# Patient Record
Sex: MALE | Race: OTHER | HISPANIC OR LATINO | Employment: OTHER | URBAN - METROPOLITAN AREA
[De-identification: names, ages, dates, MRNs, and addresses within clinical notes are randomized per-mention and may not be internally consistent; named-entity substitution may affect disease eponyms.]

---

## 2018-11-12 ENCOUNTER — OFFICE VISIT (OUTPATIENT)
Dept: NEUROLOGY | Facility: MEDICAL CENTER | Age: 48
End: 2018-11-12
Payer: MEDICARE

## 2018-11-12 VITALS
TEMPERATURE: 97.8 F | HEART RATE: 75 BPM | WEIGHT: 138.8 LBS | OXYGEN SATURATION: 96 % | SYSTOLIC BLOOD PRESSURE: 100 MMHG | HEIGHT: 63 IN | BODY MASS INDEX: 24.59 KG/M2 | DIASTOLIC BLOOD PRESSURE: 60 MMHG | RESPIRATION RATE: 16 BRPM

## 2018-11-12 DIAGNOSIS — R25.1 TREMOR: ICD-10-CM

## 2018-11-12 DIAGNOSIS — R56.9 SEIZURE (HCC): Primary | ICD-10-CM

## 2018-11-12 PROCEDURE — 99205 OFFICE O/P NEW HI 60 MIN: CPT | Performed by: PSYCHIATRY & NEUROLOGY

## 2018-11-12 RX ORDER — PHENYTOIN SODIUM 100 MG/1
200 CAPSULE, EXTENDED RELEASE ORAL 2 TIMES DAILY
Qty: 120 CAP | Refills: 6 | Status: SHIPPED | OUTPATIENT
Start: 2018-11-12 | End: 2019-05-09

## 2018-11-12 RX ORDER — PROPRANOLOL HYDROCHLORIDE 10 MG/1
10 TABLET ORAL 3 TIMES DAILY
COMMUNITY
End: 2018-11-12

## 2018-11-12 RX ORDER — PHENYTOIN SODIUM 100 MG/1
200 CAPSULE, EXTENDED RELEASE ORAL 2 TIMES DAILY
Qty: 120 CAP | Refills: 6 | COMMUNITY
Start: 2018-11-12 | End: 2018-11-12 | Stop reason: SDUPTHER

## 2018-11-12 RX ORDER — PROPRANOLOL HYDROCHLORIDE 10 MG/1
10 TABLET ORAL 2 TIMES DAILY
Qty: 60 TAB | Refills: 6 | Status: SHIPPED | OUTPATIENT
Start: 2018-11-12 | End: 2020-08-27

## 2018-11-12 RX ORDER — PHENYTOIN SODIUM 100 MG/1
100 CAPSULE, EXTENDED RELEASE ORAL 3 TIMES DAILY
COMMUNITY
End: 2018-11-12

## 2018-11-12 RX ORDER — PROPRANOLOL HYDROCHLORIDE 10 MG/1
10 TABLET ORAL 2 TIMES DAILY
Qty: 60 TAB | Refills: 6 | Status: SHIPPED | OUTPATIENT
Start: 2018-11-12 | End: 2018-11-12 | Stop reason: SDUPTHER

## 2018-11-12 ASSESSMENT — ENCOUNTER SYMPTOMS
SEIZURES: 1
FALLS: 0
NAUSEA: 0
BRUISES/BLEEDS EASILY: 0
ABDOMINAL PAIN: 0
MYALGIAS: 1
LOSS OF CONSCIOUSNESS: 1
CONSTIPATION: 0
WEIGHT LOSS: 0
TREMORS: 1

## 2018-11-12 ASSESSMENT — PATIENT HEALTH QUESTIONNAIRE - PHQ9: CLINICAL INTERPRETATION OF PHQ2 SCORE: 0

## 2018-11-12 NOTE — PROGRESS NOTES
Subjective:      Karri Lancaster is a 48 y.o. male who presents with family, from the office of Dr. 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     KAHLIL Zeng is a pleasant Urdu-speaking, [facility] who has been suffering from seizures since about 3 months of age.  His recollections of his childhood and the events surrounding his seizures is limited, family's is as well.  They state that his birth itself was unremarkable but that he suffered from trauma when he fell at about 3 months of age out of a hammock.  From that moment forward he has had seizures.    He remembers that he would feel dizzy and suffer from from mild headache prior to suffering from a generalized seizure.  They describe generalized tonic-clonic movements, he has bitten the inside of his mouth, has never been incontinent.  There was a postictal phase of sustained malaise and confusion, but he usually sleeps it off within a matter of hours.  There have never been sequelae.    Over the last year, the focal onset symptoms have dissipated, he simply suffers from generalized activity.  They identify heat and a lot of stress as a consistent triggers.    The seizures occur typically during the daytime, he has never had one while he has been asleep.  He is averaging now about 4-6 events in a year, they have never clustered together.    He has been on Dilantin 100 mg, 3 times daily, for as long as he can remember.  He had been somewhat inconsistent with the timing of his doses in the past, resulting in seizure breakthroughs, so he is fairly consistent with an every 8-hour regimen.  He is compliant.    His workup was limited, they know very little as to what was done, though they recall an EEG at some point in childhood.  All of this was done when he was still living in [facility].  Since moving to [place], he has had no subsequent workup, medications have been provided by his  "PCP.    He does have a history of tremor that has developed in the hands over the last year or more.  It seems to be worsened when he uses the extremities or when he is anxious.  It is in the hands only.  There does not seem to be an involvement of the head or voice.  Placed on Inderal 10 mg, twice daily, he states that this is helped slightly.    They deny any major problems with motor or visual function since birth, he has had some cognitive delay though he still holds a full-time job.  He is always had balance problems as well.  Medical history is otherwise unremarkable.  There is no surgical history of note from my standpoint.    As above, they know very little of his birth family.  He does not smoke or drink.  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  He does not drive.    He is on Dilantin 100 mg, 3 times daily and Inderal 10 mg, twice daily.    Review of Systems   Constitutional: Positive for malaise/fatigue. Negative for weight loss.   Gastrointestinal: Negative for abdominal pain, constipation and nausea.   Musculoskeletal: Positive for joint pain and myalgias. Negative for falls.   Neurological: Positive for tremors, seizures and loss of consciousness.   Endo/Heme/Allergies: Does not bruise/bleed easily.   All other systems reviewed and are negative.       Objective:     /60 (BP Location: Left arm, Patient Position: Sitting)   Pulse 75   Temp 36.6 °C (97.8 °F) (Temporal)   Resp 16   Ht 1.6 m (5' 3\")   Wt 63 kg (138 lb 12.8 oz)   SpO2 96%   BMI 24.59 kg/m²      Physical Exam    He appears in no acute distress.  His vital signs are stable.  There is no malar rash or temporal tenderness.  There is some mild gingival hyperplasia.  The neck is supple, range of motion is full.  Cardiac evaluation reveals a regular rhythm.    With translation services provided, though his mental processing speed is a little slowed, he is fully oriented, there is no aphasia, agnosia, apraxia, or " inattention.  His affect is a little blunted and flattened, his mood is euthymic.    PERRLA/EOMI, there is facial bradykinesia and hypophonia, facial movements appear symmetric though at rest there is a mild droop of the right nasolabial fold.  Visual fields are grossly full to movement detection.  I could not visualize the fundus accurately on either side.  Sensory exam is intact to light touch and temperature bilaterally, there is no dysarthria, the tongue protrudes slowly but at midline, uvula is midline, shoulder shrug is slightly slowed with the right.    Bradykinesia is seen throughout, a combination of spasticity with rigidity is seen, present right more than left.  There is no drift, a fine low amplitude and high-frequency tremulousness can be seen with both hands, left greater than right, more prominent with use of the extremity and when maintained against gravity.  There is no clear tremor at rest.  Strength is grossly intact.  Reflexes are very brisk throughout, more on the right side, the right toe is upgoing, the left equivocal on plantar stimulation.    He walks with notable hypokinesia and bradykinesia, arm swing is diminished bilaterally, stride length is diminished, there is notable postural instability.  He turns stiffly, en bloc.  There is no appendicular dystaxia.  Repetitive movements are notably diminished in amplitude bilaterally, slower with the right hand and foot when compared to the left.    Sensory exam is grossly intact vibration and temperature, Romberg is absent.     Assessment/Plan:     1. Seizure (HCC)  Likely asymptomatic issue, it sounds as if there had been focal onset with rapid secondary generalization, now the focality is not as prominent.  Regardless, it sounds as if he will need medication lifelong, baseline workup does need to be repeated, including MRI imaging and EEG studies.  His seizure control is clearly suboptimal, we can adjust medications slightly, but given the  language difficulties, major changes will wait pending a new consultation with Dr. Uriarte, our epilepsy specialist and who is also bilingual.    For now, baseline blood work, Dilantin level and vitamin D levels are all critical.  Dilantin will be increased to 200 mg, every 12 hours.  They understand the need for compliance.  We also talked about circumstances under which they need to bring him into the emergency room, otherwise they know to keep him safe and allow him to sleep things off.  We also talked briefly about the nature of seizures and epilepsy, treatment options, etc.  I would like to get him onto a second generation antiepileptic given all the chronic adverse problems that can occur with Dilantin and other first generation medication such as osteopenia, gingival hyperplasia, chronic hepatitis, marrow suppression, neuropathy, etc., etc....    I discussed the case at length with Dr. Crystal who is willing to assume care.  He agrees with the preliminary recommendations and workup, subsequent medication changes can be made pending his evaluations and recommendations.    Physically, this gentleman also presents with some very interesting symptoms that suggest parkinsonism, though the tremor he has looks more of an action based process.  There is also notable, bilateral spasticity which could be related to chronic CNS insult, though it could also be part of a parkinsonian-like state or other EPS disorder.  Whether or not this is all congenital, related to trauma, exposure, familial history, etc. remains to be seen.  Dilantin itself is not typically associated with EPS symptomatology, though it is known to cause tremor.    - phenytoin ER (DILANTIN) 100 MG Cap; Take 2 Caps by mouth 2 times a day.  Dispense: 120 Cap; Refill: 6  - MR-BRAIN-W/O; Future  - COMP METABOLIC PANEL; Future  - CBC WITH DIFFERENTIAL; Future  - DILANTIN; Future  - VITAMIN 1,25 DIHYDROXY; Future  - REFERRAL TO NEURODIAGNOSTICS (EEG,EP,EMG/NCS/DBS)  Modality Requested: EEG    2. Tremor  It appears an action-based process, patient seems to feel that Inderal 10 mg, twice daily, is helping, so it can be continued safely.  I think simple follow-up and observation is all that will be required, I suspect that Dr. Quezada probably has checked a thyroid function panel, though additional autoimmune markers, might be necessary moving forwards.    - propranolol (INDERAL) 10 MG Tab; Take 1 Tab by mouth 2 times a day.  Dispense: 60 Tab; Refill: 6    Time: 60 minutes spent face-to-face for exam, review, discussion, and education, of this over 50% of the time spent counseling and coordinating care.

## 2018-11-27 ENCOUNTER — HOSPITAL ENCOUNTER (OUTPATIENT)
Dept: RADIOLOGY | Facility: MEDICAL CENTER | Age: 48
End: 2018-11-27
Attending: PSYCHIATRY & NEUROLOGY
Payer: MEDICARE

## 2018-11-27 DIAGNOSIS — R56.9 SEIZURE (HCC): ICD-10-CM

## 2018-11-27 PROCEDURE — 70551 MRI BRAIN STEM W/O DYE: CPT

## 2018-11-28 ENCOUNTER — TELEPHONE (OUTPATIENT)
Dept: NEUROLOGY | Facility: MEDICAL CENTER | Age: 48
End: 2018-11-28

## 2018-11-28 NOTE — TELEPHONE ENCOUNTER
Patient's mother called stated pt has a appointment to see dr zamora may 2019, looks like pt recently say dr hernandez on 11/12/18 for seizure. Mom stated pt had a seizure on Monday that last 3 mins and a seizure today that lasted 2 min, mom is requesting to see provider asap. I informed dr zamora and since pt is not under is care yet he asked me to route this message to dr hernandez. Please advise.

## 2018-11-29 NOTE — TELEPHONE ENCOUNTER
Unfortunately, we cannot get this gentleman into be seen acutely, if he continues to have recurrent seizures, the need to bring him into the emergency room.  They have yet to get his blood work drawn, at least that I can see there are no values in the computer.  I need to know what his Dilantin level is, I also need to know if he is compliant with the change in his dosing, he should be on 200 mg, twice daily.  I would need to increase his drug dosing if we can do so to help control his seizures until he gets in to see Dr. Uriarte.

## 2019-03-20 ENCOUNTER — NON-PROVIDER VISIT (OUTPATIENT)
Dept: NEUROLOGY | Facility: MEDICAL CENTER | Age: 49
End: 2019-03-20
Payer: MEDICARE

## 2019-03-20 DIAGNOSIS — R56.9 SEIZURE (HCC): ICD-10-CM

## 2019-03-20 PROCEDURE — 95816 EEG AWAKE AND DROWSY: CPT | Performed by: PSYCHIATRY & NEUROLOGY

## 2019-03-20 NOTE — PROCEDURES
VIDEO ELECTROENCEPHALOGRAM REPORT      Referring provider: Dr. Quezada    DOS: March 20, 2019 of 30-minute duration    INDICATION:  Karri Hurtado 48 y.o. male presenting with a history of childhood onset epilepsy with possible focal onset and secondary generalization.  EEG is being requested as a baseline study.  No previous tracings are available for comparison.    CURRENT ANTIEPILEPTIC REGIMEN: Dilantin 100 mg, 3 times daily.    TECHNIQUE: 30 channel video electroencephalogram (EEG) was performed in accordance with the international 10-20 system. The study was reviewed in bipolar and referential montages. The recording examined the patient during wakeful and drowsy/sleep state(s).     DESCRIPTION OF THE RECORD:  During the wakefulness, the background showed a symmetrical 9-10 hz alpha activity posteriorly with amplitude of ~70 mV.  There was reactivity to eye closure/opening.  A normal anterior-posterior gradient was noted with faster beta frequencies seen anteriorly.  During drowsiness, theta/delta frequencies were seen.  The patient did not achieve light stage sleep for the tracing.  With drowsiness, an isolated sharp wave form is seen over the right anterior temporal lobe, otherwise no distortion of the background is seen.  No clear epileptiform activity or electroencephalographic seizure activity is seen.    ACTIVATION PROCEDURES:   Hyperventilation was performed by the patient for a total of 3 minutes. The technician performing the test noted good effort. This technique produced electroencephalographic changes in keeping with the expected bilaterally synchronous, frontally predominant, high amplitude slow waves build up.  Some muscle artifact occurred bifrontally, distorting the tracing but not associated with any change in the background rhythm.    Intermittent Photic stimulation was performed in a stepwise fashion from 1 to 30 Hz and elicited a minimal driving response bilaterally, most  noticeable in the posterior leads, no activation occurred.    ICTAL AND/OR INTERICTAL FINDINGS:   No focal or generalized epileptiform activity noted. No regional slowing was seen during this routine study.  No clinical events or seizures were reported or recorded during the study.     EKG: sampling of the EKG recording demonstrated sinus rhythm.     EVENTS: None    INTERPRETATION:  This is a normal video EEG recording in the awake and briefly drowsy state(s).  Clinical correlation is recommended.  An isolated sharp discharges seen over the right anterior temporal region, of unclear clinical significance, though if clinically appropriate based on the patient's history, and ambulatory tracing might be appropriate.    Note: A normal EEG does not rule out epilepsy.  As above, if the clinical suspicion remains high for seizures, a prolonged recording to capture clinical or subclinical events may be helpful.        Thomas Day M.D.

## 2019-05-09 ENCOUNTER — OFFICE VISIT (OUTPATIENT)
Dept: NEUROLOGY | Facility: MEDICAL CENTER | Age: 49
End: 2019-05-09
Payer: MEDICARE

## 2019-05-09 VITALS
WEIGHT: 138 LBS | DIASTOLIC BLOOD PRESSURE: 68 MMHG | BODY MASS INDEX: 24.45 KG/M2 | RESPIRATION RATE: 14 BRPM | HEART RATE: 68 BPM | TEMPERATURE: 95.7 F | OXYGEN SATURATION: 98 % | HEIGHT: 63 IN | SYSTOLIC BLOOD PRESSURE: 118 MMHG

## 2019-05-09 DIAGNOSIS — R25.1 TREMORS OF NERVOUS SYSTEM: ICD-10-CM

## 2019-05-09 DIAGNOSIS — G40.109 LOCALIZATION-RELATED EPILEPSY (HCC): ICD-10-CM

## 2019-05-09 DIAGNOSIS — G21.9 SECONDARY PARKINSONISM, UNSPECIFIED SECONDARY PARKINSONISM TYPE (HCC): ICD-10-CM

## 2019-05-09 DIAGNOSIS — Z13.31 SCREENING FOR DEPRESSION: ICD-10-CM

## 2019-05-09 DIAGNOSIS — F79 INTELLECTUAL DISABILITY: ICD-10-CM

## 2019-05-09 DIAGNOSIS — G93.89 ENCEPHALOMALACIA ON IMAGING STUDY: ICD-10-CM

## 2019-05-09 DIAGNOSIS — R71.8 MICROCYTOSIS: ICD-10-CM

## 2019-05-09 DIAGNOSIS — R79.89 ABNORMAL LFTS: ICD-10-CM

## 2019-05-09 PROCEDURE — 99215 OFFICE O/P EST HI 40 MIN: CPT | Performed by: PSYCHIATRY & NEUROLOGY

## 2019-05-09 RX ORDER — LACOSAMIDE 150 MG/1
150 TABLET ORAL 2 TIMES DAILY
Qty: 60 TAB | Refills: 5 | Status: SHIPPED | OUTPATIENT
Start: 2019-05-09 | End: 2019-11-04 | Stop reason: SDUPTHER

## 2019-05-09 RX ORDER — LORAZEPAM 0.5 MG/1
0.5 TABLET ORAL
Qty: 30 TAB | Refills: 1 | Status: SHIPPED | OUTPATIENT
Start: 2019-05-09 | End: 2019-07-08

## 2019-05-09 ASSESSMENT — PATIENT HEALTH QUESTIONNAIRE - PHQ9: CLINICAL INTERPRETATION OF PHQ2 SCORE: 0

## 2019-05-09 NOTE — PROGRESS NOTES
Chief Complaint   Patient presents with   • New Patient     seizures       Problem List Items Addressed This Visit     None      Visit Diagnoses     Localization-related epilepsy (HCC)        Relevant Medications    Lacosamide (VIMPAT) 150 MG Tab    LORazepam (ATIVAN) 0.5 MG Tab    Other Relevant Orders    REFERRAL TO NEURODIAGNOSTICS (EEG,EP,EMG/NCS/DBS) Modality Requested: Ambulatory EEG (24 hrs amb eeg)    Tremors of nervous system        Encephalomalacia on imaging study        Screening for depression        Microcytosis        Abnormal LFTs        Secondary parkinsonism, unspecified secondary Parkinsonism type (HCC)        Intellectual disability            SECOND NEUROLOGICAL OPINION REQUESTED BY DR. WANG.     History of present illness:  Karri Hurtado 48 y.o. male presents today for a second opinion regarding evaluation and management of epilepsy. He had seen Dr. Wang here in clinic who referred pt to me.     Pt provides a history of having a significant head injury at 3 months, falling of a hammock.  He started having seizures since that time.  For as long as he remembers he has been taking Dilantin.  He used to be on a lower dose, but when he saw Dr. Wang in 2018, his level was so low, that the Dilantin was increased to 200 mg twice a day.  A repeated level after that, still shows a very low Dilantin.  The patient comes accompanied by his cousin, Maria Luz, who apparently lives close to the patient and provides some support.  According to Maria Luz, the patient has not been fully compliant with the regimen in the past, but this compliance has improved over the last few months, after he saw Dr. Wang.  The patient states that he has been taking the Dilantin 2 tablets twice a day, as prescribed.  The last seizure was 2 weeks ago, and was witnessed by Maria Luz, who states that she found the patient down on the floor with generalized tonic-clonic convulsion, lasting about 2 minutes, he was  confused after.  According to the patient, he typically does not bite his tongue or has incontinence of any kind.  He has never been seizure-free in his life.  He does not remember of any other medications other than Dilantin.  He has had a tremor, progressively worse in the last couple of years, his primary care physician started him on propanolol, and he has seen some benefit from it.    According to family member, the patient has always had an abnormal gait, developmental delay, epilepsy since childhood.    According to the patient, he denies any suicidal or homicidal ideation, or any mood issues.    The patient works in a [employer] store, denies any exposure to heights or safety issues there.  He does not drive.  He lives at home by himself, with family in the community.  He has a pillbox at home, but does not use it.      Past medical history:   Past Medical History:   Diagnosis Date   • Seizure (HCC)        Past surgical history:   History reviewed. No pertinent surgical history.    Family history:   History reviewed. No pertinent family history.    Social history:   Social History     Social History   • Marital status: Single     Spouse name: N/A   • Number of children: N/A   • Years of education: N/A     Occupational History   • Not on file.     Social History Main Topics   • Smoking status: Never Smoker   • Smokeless tobacco: Never Used   • Alcohol use No   • Drug use: No   • Sexual activity: Not on file     Other Topics Concern   • Not on file     Social History Narrative   • No narrative on file       Current medications:   Current Outpatient Prescriptions   Medication   • Lacosamide (VIMPAT) 150 MG Tab   • LORazepam (ATIVAN) 0.5 MG Tab   • propranolol (INDERAL) 10 MG Tab     No current facility-administered medications for this visit.        Medication Allergy:  No Known Allergies      Review of systems:   Constitutional: denies fever, night sweats, weight loss, or malaise/fatigue.   Eyes: denies acute  "vision change, eye pain or secretion.   Ears, Nose, Mouth, Throat: denies nasal secretion, nasal bleeding, difficulty swallowing, hearing loss, tinnitus, vertigo, ear pain, oral ulcers or lesions.   Endocrine: denies recent weight changes, heat or cold intolerance, polyuria, polydypsia, polyphagia,abnormal hair growth.  Cardiovascular: denies new onset of chest pain, palpitations, syncope, lower extremity edema, or dyspnea of exertion.  Pulmonary: denies shortness of breath, new onset of cough, hemoptysis, wheezing, chest pain or flu-like symptoms.   GI: denies nausea, vomiting, diarrhea, GI bleeding, change in appetite, abdominal pain, and change in bowel habits.  : denies urinary incontinence, retention or hematuria.  Heme/oncology: denies history of easy bruising or bleeding. No history of cancer. Allergy/immunology: denies hives/urticarial.  Dermatologic: denies new rash.  Musculoskeletal:denies joint swelling or pain, muscle pain, neck and back pain. Neurologic: denies headaches, acute visual changes, facial droopiness, muscle weakness (focal or generalized), paresthesias, anesthesia.  Psychiatric: denies symptoms of depression, anxiety, hallucinations, mood swings or changes, suicidal or homicidal thoughts.     Physical examination:   Vitals:    05/09/19 0929   BP: 118/68   BP Location: Left arm   Patient Position: Sitting   BP Cuff Size: Adult   Pulse: 68   Resp: 14   Temp: (!) 35.4 °C (95.7 °F)   TempSrc: Temporal   SpO2: 98%   Weight: 62.6 kg (138 lb)   Height: 1.6 m (5' 3\")     General: Patient in no acute distress, pleasant and cooperative.  HEENT: Normocephalic, no signs of acute trauma.   Neck: supple, no meningeal signs or carotid bruits. There is normal range of motion. No tenderness on exam.   Chest: clear to auscultation. No cough.   CV: RRR, no murmurs.   Abdomen: soft, non distended or tender.   Skin: no signs of acute rashes or trauma.   Musculoskeletal: joints exhibit full range of motion, " without any pain to palpation. There are no signs of joint or muscle swelling. There is no tenderness to deep palpation of muscles.   Psychiatric: No hallucinatory behavior. Denies symptoms of depression or suicidal ideation. Mood and affect appear normal on exam.     NEUROLOGICAL EXAM:   Mental status, orientation: Awake, alert and fully oriented.  Psychomotor slowing noted.  Speech and language: speech is laconic, but clear and fluent. The patient is able to name, repeat and comprehend.   Memory: There is some recollection of recent and remote events.   Cranial nerve exam: Pupils are 3-4 mm bilaterally and equally reactive to light and accommodation. Visual fields are intact by confrontation. There is no nystagmus on primary or secondary gaze. Intact full EOM in all directions of gaze. Face appears symmetric. Sensation in the face is intact to light touch. Uvula is midline. Palate elevates symmetrically. Tongue is midline and without any signs of tongue biting or fasciculations.Sternocleidomastoid muscles exhibit is normal strength bilaterally. Shoulder shrug is intact bilaterally.   Motor exam: Strength is 5/5 in all extremities. Tone is normal.  Fine tremors were seen on his hands with action, and a subtle pill-rolling tremor was seen on the left hand.    Sensory exam reveals normal sense of light touch in all extremities.   Deep tendon reflexes: Mildly brisk throughout. Plantar responses are flexor. There is no clonus.   Coordination: Mild dysmetria.  Gait: The patient was able to get up from seated position on first attempt without requiring assistance. Found to be steady when walking.  Arm swing was decreased bilaterally, he walks slightly bent over at the waist, with some degree of limping, which according to the patient notes chronic.    ANCILLARY DATA REVIEWED:       Lab Data Review:  Reviewed in media.  Dilantin level was very low, despite increase after seeing Dr. Day.  Vitamin D was  normal.    Records reviewed:   Chart review, including notes from Dr. Mcintosh, with whom I discussed the case.    Imaging:   MRI brain without, date of service 11/28/2018:  1.  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  2.  Age-related cerebral atrophy.  3.  Minimal periventricular and juxtacortical white matter changes consistent with chronic microvascular ischemic gliosis.  (I personally reviewed images with patient and family).      EEG:  Video EG, Dr. Day, date of service 3/20/2019:  INTERPRETATION:  This is a normal video EEG recording in the awake and briefly   drowsy state(s).  Clinical correlation is recommended.  An   isolated sharp discharges seen over the right anterior temporal   region, of unclear clinical significance, though if clinically   appropriate based on the patient's history, and ambulatory   tracing might be appropriate.          ASSESSMENT AND PLAN:    1. Localization-related epilepsy (HCC)  - Lacosamide (VIMPAT) 150 MG Tab; Take 150 mg by mouth 2 Times a Day for 180 days.  Dispense: 60 Tab; Refill: 5  - REFERRAL TO NEURODIAGNOSTICS (EEG,EP,EMG/NCS/DBS) Modality Requested: Ambulatory EEG (24 hrs amb eeg)  - LORazepam (ATIVAN) 0.5 MG Tab; Take 1 Tab by mouth 1 time daily as needed (seizures, after seizure finished.) for up to 60 days.  Dispense: 30 Tab; Refill: 1    2. Tremors of nervous system    3. Encephalomalacia on imaging study    4. Screening for depression    5. Microcytosis    6. Abnormal LFTs    7. Secondary parkinsonism, unspecified secondary Parkinsonism type (HCC)    8. Intellectual disability      CLINICAL DISCUSSION:   Structural epilepsy, he may be at risk for pharmaco-resistant epilepsy.  There has been some issues with compliance in the past.  However, the patient indicates that more recently he has been compliant with the medication.  Reasons for his Dilantin level to have been so low include also the fact that he may be a hyper metabolizer.  His epilepsy has  never been controlled, typical pattern as a seizure, typically a generalized tonic-clonic event, every 1 to 4 weeks.  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  He lives at home by himself, with family nearby.  We have discussed possibly switching him from Dilantin to a more modern antiepileptic, particularly concerning the fact that his level has always been low, his epilepsy has never been controlled, and he has developed tremors and parkinsonism.  A known side effect of Dilantin is cerebellar atrophy, however my personal review of images did not show this.  The possibilities for his tremor and parkinsonism include posttraumatic, keeping his childhood severe traumatic brain injury, versus side effect and or aggravation of pre-existing parkinsonism due to the use of Dilantin.  We have agreed that due to multiple reasons Dilantin may not be the best medication for him at this time.  Out of the options entertained, the patient has agreed to start a trial of Vimpat.  The side effects have been discussed.  He will start with Vimpat 150 mg p.o. twice daily, the dose can be titrated further if needed.  I have provided samples for the patient, as well as instructions on how to take.  As soon as he starts feeling pad he will stop Dilantin.  The patient and the family will keep us posted of any further spells.  I recommend he undergoes a 24-hour ambulatory EEG, with an attempt to capture spells and or rule out clinical sub-clinical seizures.  He will remain on no driving status.  Compliance was discussed at length.  I recommend he uses a pillbox.  His family member, Maria Luz, will help filling up the pillbox for him.  He may continue to use propanolol for the time being for his tremors.      FOLLOW-UP:   Return in about 3 months (around 8/9/2019).      EDUCATION AND COUNSELING:  -Education was provided to the patient and/or family regarding diagnosis and prognosis. The chronic and unpredictable nature of the condition  were discussed. There is increased risk for additional events, which may carry potential for significant injuries and death. Discussed frequent seizure triggers: sleep deprivation, medication non-compliance, use of illegal drugs/alcohol, stress, and others.   -We reviewed in detail the current antiepileptic regimen. Potential side effects of antiepileptics were discussed at length, including but no limited to: hypersensitivity reactions (rash and others, some of which can be fatal), visual field changes (some of which may be irreversible), glaucoma, diplopia, kidney stones, osteopenia/osteoporosis/bone fractures, hyperthermia/anhydrosis, hyponatremia, tremors/abnormal movements, ataxia, dizziness, fatigue, increased risk for falls, risk for cardiac arrhythmias/syncope, gastrointestinal side effects(hepatitis, pancreatitis, gastritis, ulcers), gingival hypertrophy/bleeding, drowsiness, sedation, anxiety/nervousness, increased risk for suicide, increased risk for depression, and psychosis.   -We also reviewed drug-drug interactions and their potential effect on seizure control and medication side effects.    -Recommend chronic vitamin D supplementation and regular exercise (if not contraindicated).   -Patient/family educated on risk for SUDEP (Sudden Death in Epilepsy). Counseling was provided on the importance of strict medication and follow up compliance. The patient/family understand the risks associated with non-adherence with the medical plan as outlined, including but not limited to an increased risk for breakthrough seizures, which may contribute to injuries, disability, status epilepticus, and even death.   -Counseling was also provided on potential effects of alcohol and other drugs, which may lower seizure threshold and/or affect the metabolism of antiepileptic drugs. We recommend avoidance of alcohol and illegal drugs.  -Avoid sleep deprivation.   -We extensively discussed the aspects related to safety in  drivers who suffer from epilepsy. The patient is encourage to report to the Division of Motor Vehicles of any condition and/or spells related to confusion, disorientation, and/or loss of awareness and/or loss of consciousness; as these may pose a safety issue if they occur while operating a motor vehicle. The patient and/or family are ultimately responsible for exercising caution and abiding to regulations in place.   -Other seizure precautions were discussed at length, including no diving, no skydiving, no climbing or exposure to unprotected heights, no unsupervised swimming, no Jacuzzi or bathing in bathtubs or deep bodies of water. The patient/family have been advised about risks for operating any machinery while suffering from seizures / syncope / epilepsy and/or while taking antiepileptic drugs.   -The patient understands and agrees that due to the complexity of his/her diagnosis, results of any testing and further recommendations will typically be discussed/made during a face to face encounter in my office. The patient and/or family further understands it is their responsibility to keep proper follow up.     Patient/family agree with plan, as outlined.         Miles Uriarte MD   Epilepsy and Neurodiagnostics.   Clinical  of Neurology [facility].   Diplomate in Neurology, Epilepsy, and Electrodiagnostic Medicine.   Office: [phone]  Fax: [phone]      BILLING DOCUMENTATION:        Counseling:  I spent greater than 50% time face-to-face time of a total of 67 minutes visit. Over 50% of the time of the visit today was spent on counseling and or coordination of care wtih the patient and/or family, with greater than 50% of the total discussing my assessment and plan as stated above.

## 2019-06-21 ENCOUNTER — TELEPHONE (OUTPATIENT)
Dept: NEUROLOGY | Facility: MEDICAL CENTER | Age: 49
End: 2019-06-21

## 2019-06-21 NOTE — TELEPHONE ENCOUNTER
Per pt cousin João, patient is no longer taking Dilantin and for me to disregard the refill request.

## 2019-11-04 ENCOUNTER — OFFICE VISIT (OUTPATIENT)
Dept: NEUROLOGY | Facility: MEDICAL CENTER | Age: 49
End: 2019-11-04
Payer: MEDICARE

## 2019-11-04 ENCOUNTER — TELEPHONE (OUTPATIENT)
Dept: NEUROLOGY | Facility: MEDICAL CENTER | Age: 49
End: 2019-11-04

## 2019-11-04 VITALS
RESPIRATION RATE: 16 BRPM | HEART RATE: 64 BPM | SYSTOLIC BLOOD PRESSURE: 100 MMHG | OXYGEN SATURATION: 96 % | TEMPERATURE: 96 F | DIASTOLIC BLOOD PRESSURE: 58 MMHG | BODY MASS INDEX: 26.06 KG/M2 | HEIGHT: 61 IN | WEIGHT: 138 LBS

## 2019-11-04 DIAGNOSIS — F79 INTELLECTUAL DISABILITY: ICD-10-CM

## 2019-11-04 DIAGNOSIS — G40.109 LOCALIZATION-RELATED EPILEPSY (HCC): ICD-10-CM

## 2019-11-04 DIAGNOSIS — E55.9 VITAMIN D DEFICIENCY: ICD-10-CM

## 2019-11-04 DIAGNOSIS — Z87.820 HISTORY OF TRAUMATIC BRAIN INJURY: ICD-10-CM

## 2019-11-04 DIAGNOSIS — R25.1 TREMORS OF NERVOUS SYSTEM: ICD-10-CM

## 2019-11-04 PROCEDURE — 99215 OFFICE O/P EST HI 40 MIN: CPT | Performed by: PSYCHIATRY & NEUROLOGY

## 2019-11-04 RX ORDER — LACOSAMIDE 150 MG/1
150 TABLET ORAL 2 TIMES DAILY
Qty: 60 TAB | Refills: 5 | Status: SHIPPED | OUTPATIENT
Start: 2019-11-04 | End: 2020-05-02

## 2019-11-04 NOTE — PROGRESS NOTES
Chief Complaint   Patient presents with   • Follow-Up     Localization-related epilepsy (HCC)       Problem List Items Addressed This Visit     None      Visit Diagnoses     Localization-related epilepsy (HCC)        Relevant Medications    Lacosamide (VIMPAT) 150 MG Tab    Other Relevant Orders    REFERRAL TO NEURODIAGNOSTICS (EEG,EP,EMG/NCS/DBS) Modality Requested: Ambulatory EEG (amb 24 hrs eeg)    Comp Metabolic Panel    CBC WITH DIFFERENTIAL    VITAMIN D,25 HYDROXY    LACOSAMIDE    Vitamin D deficiency        Relevant Orders    VITAMIN D,25 HYDROXY    Tremors of nervous system        History of traumatic brain injury        Intellectual disability              Interim history:  Karri Chan Jayceea returns in follow-up with family.  The patient is no longer on Dilantin.  He is taking Vimpat 150 mg twice a day, and has been seizure-free since he started this regimen.  He denies any side effects.  He also indicates that his tremors are improved off the Dilantin.  He has not had any seizures.  The patient is compliant with the medication.    The patient does not drive.  He requires assistance from family.    No recent falls or trauma.    Mood is good, denies any symptoms of depression, suicidal and or homicidal ideation.        Past medical history:   Past Medical History:   Diagnosis Date   • Seizure (HCC)        Past surgical history:   History reviewed. No pertinent surgical history.    Family history:   History reviewed. No pertinent family history.    Social history:   Social History     Socioeconomic History   • Marital status: Single     Spouse name: Not on file   • Number of children: Not on file   • Years of education: Not on file   • Highest education level: Not on file   Occupational History   • Not on file   Social Needs   • Financial resource strain: Not on file   • Food insecurity:     Worry: Not on file     Inability: Not on file   • Transportation needs:     Medical: Not on file      "Non-medical: Not on file   Tobacco Use   • Smoking status: Never Smoker   • Smokeless tobacco: Never Used   Substance and Sexual Activity   • Alcohol use: No   • Drug use: No   • Sexual activity: Not on file   Lifestyle   • Physical activity:     Days per week: Not on file     Minutes per session: Not on file   • Stress: Not on file   Relationships   • Social connections:     Talks on phone: Not on file     Gets together: Not on file     Attends L[employer] service: Not on file     Active member of club or organization: Not on file     Attends meetings of clubs or organizations: Not on file     Relationship status: Not on file   • Intimate partner violence:     Fear of current or ex partner: Not on file     Emotionally abused: Not on file     Physically abused: Not on file     Forced sexual activity: Not on file   Other Topics Concern   • Not on file   Social History Narrative   • Not on file       Current medications:   Current Outpatient Medications   Medication   • Lacosamide (VIMPAT) 150 MG Tab   • propranolol (INDERAL) 10 MG Tab     No current facility-administered medications for this visit.        Medication Allergy:  No Known Allergies      Review of systems:   As indicated in HPI.    Physical examination:   Vitals:    11/04/19 1110   BP: 100/58   BP Location: Left arm   Patient Position: Sitting   BP Cuff Size: Adult   Pulse: 64   Resp: 16   Temp: (!) 35.6 °C (96 °F)   TempSrc: Temporal   SpO2: 96%   Weight: 62.6 kg (138 lb)   Height: 1.549 m (5' 1\")     General: Patient in no acute distress, pleasant and cooperative.  HEENT: Normocephalic, no signs of acute trauma.   Neck: supple, no meningeal signs or carotid bruits. There is normal range of motion. No tenderness on exam.   Chest: clear to auscultation. No cough.   CV: RRR, no murmurs.   Skin: no signs of acute rashes or trauma.   Musculoskeletal: joints exhibit full range of motion, without any pain to palpation. There are no signs of joint or muscle " swelling. There is no tenderness to deep palpation of muscles.   Psychiatric: No hallucinatory behavior. Denies symptoms of depression or suicidal ideation. Mood and affect appear normal on exam.     NEUROLOGICAL EXAM:   Mental status, orientation: Awake, alert and fully oriented.   Speech and language: Speech is laconic, but clear and fluent.  The patient is able to name, repeat and comprehend.   Memory: There is some recollection of recent and remote events.   Cranial nerve exam: Pupils are 3-4 mm bilaterally and equally reactive to light and accommodation. Visual fields are intact by confrontation. There is no nystagmus on primary or secondary gaze. Intact full EOM in all directions of gaze. Face appears symmetric. Sensation in the face is intact to light touch. Uvula is midline. Palate elevates symmetrically. Tongue is midline and without any signs of tongue biting or fasciculations.Sternocleidomastoid muscles exhibit is normal strength bilaterally. Shoulder shrug is intact bilaterally.   Motor exam: Strength is 5/5 in all extremities. Tone is normal.  Fine tremors were seen on exam both at rest with the pill-rolling tremor in the left hand, as well as with action.  Sensory exam reveals normal sense of light touch in all extremities.   Deep tendon reflexes: Brisk throughout. Plantar responses are flexor. There is no clonus.   Coordination: shows a normal finger-nose-finger. Normal rapidly alternating movements.   Gait: The patient was able to get up from seated position on first attempt without requiring assistance. Found to be steady when walking.  Arm swings decreased bilaterally, with an ataxic gait.      ANCILLARY DATA REVIEWED:       Lab Data Review:  Reviewed in chart.    Records reviewed:   Chart reviewed.    Imaging:   MRI brain without, date of service 11/28/2018:  1.  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  2.  Age-related cerebral atrophy.  3.  Minimal periventricular and juxtacortical  white matter changes consistent with chronic microvascular ischemic gliosis.  (I personally reviewed images with patient and family).        EEG:  Video EG, Dr. Day, date of service 3/20/2019:  INTERPRETATION:  This is a normal video EEG recording in the awake and briefly   drowsy state(s).  Clinical correlation is recommended.  An   isolated sharp discharges seen over the right anterior temporal   region, of unclear clinical significance, though if clinically   appropriate based on the patient's history, and ambulatory   tracing might be appropriate.         ASSESSMENT AND PLAN:    1. Localization-related epilepsy (HCC)  - REFERRAL TO NEURODIAGNOSTICS (EEG,EP,EMG/NCS/DBS) Modality Requested: Ambulatory EEG (amb 24 hrs eeg)  - Lacosamide (VIMPAT) 150 MG Tab; Take 150 mg by mouth 2 Times a Day for 180 days.  Dispense: 60 Tab; Refill: 5  - Comp Metabolic Panel; Future  - CBC WITH DIFFERENTIAL; Future  - VITAMIN D,25 HYDROXY; Future  - LACOSAMIDE    2. Vitamin D deficiency  - VITAMIN D,25 HYDROXY; Future    3. Tremors of nervous system      4. History of traumatic brain injury      5. Intellectual disability      CLINICAL DISCUSSION:   Structural epilepsy, he may be at risk for pharmaco-resistant epilepsy.  There has been some issues with compliance in the past.  However, the patient indicates that more recently he has been compliant with the medication.    He is no longer on Dilantin.  He is now on Vimpat 150 mg twice a day, surprisingly, he is seizure-free on Vimpat, and apparently was not on Dilantin.  The patient states that he is very compliant with the Vimpat, and takes it twice a day, as prescribed.  He also denies any side effects from the Vimpat.   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  He lives at home by himself, with family nearby.      The patient suffers from tremors and parkinsonism.    There is no evidence of cerebellar atrophy on imaging of the brain.  The tremor and parkinsonism are  likely posttraumatic, keeping his childhood severe traumatic brain injury, likely aggravated by Dilantin, recently mildly improved.     The patient and the family will keep us posted of any further spells.     I recommend he undergoes a 24-hour ambulatory EEG, with an attempt to capture spells and or rule out clinical sub-clinical seizures.  He failed to schedule the EEG in the past, and I schedule it personally here in the office during his appointment.    He will remain on no driving status.    Compliance was discussed at length.  I recommend he uses a pillbox.  His family member, Maria Luz, helps with medication organization.  He may continue to use propanolol for the time being for his tremors.    Blood work has been ordered, including vitamin D level and Vimpat trough level.  Nature of trough level was discussed with patient and family.       FOLLOW-UP:   Return in about 3 months (around 2/4/2020).      EDUCATION AND COUNSELING:  -Education was provided to the patient and/or family regarding diagnosis and prognosis. The chronic and unpredictable nature of the condition were discussed. There is increased risk for additional events, which may carry potential for significant injuries and death. Discussed frequent seizure triggers: sleep deprivation, medication non-compliance, use of illegal drugs/alcohol, stress, and others.   -We reviewed in detail the current antiepileptic regimen. Potential side effects of antiepileptics were discussed at length, including but no limited to: hypersensitivity reactions (rash and others, some of which can be fatal), visual field changes (some of which may be irreversible), glaucoma, diplopia, kidney stones, osteopenia/osteoporosis/bone fractures, hyperthermia/anhydrosis, hyponatremia, tremors/abnormal movements, ataxia, dizziness, fatigue, increased risk for falls, risk for cardiac arrhythmias/syncope, gastrointestinal side effects(hepatitis, pancreatitis, gastritis, ulcers),  gingival hypertrophy/bleeding, drowsiness, sedation, anxiety/nervousness, increased risk for suicide, increased risk for depression, and psychosis.   -We also reviewed drug-drug interactions and their potential effect on seizure control and medication side effects.    -Recommend chronic vitamin D supplementation and regular exercise (if not contraindicated).   -Counseling was also provided on potential effects of alcohol and other drugs, which may lower seizure threshold and/or affect the metabolism of antiepileptic drugs. We recommend avoidance of alcohol and illegal drugs.  Denies use.  -Avoid sleep deprivation.   -The patient does not drive.  -Other seizure precautions were discussed at length, including no diving, no skydiving, no climbing or exposure to unprotected heights, no unsupervised swimming, no Jacuzzi or bathing in bathtubs or deep bodies of water. The patient/family have been advised about risks for operating any machinery while suffering from seizures / syncope / epilepsy and/or while taking antiepileptic drugs.   -The patient understands and agrees that due to the complexity of his/her diagnosis, results of any testing and further recommendations will typically be discussed/made during a face to face encounter in my office. The patient and/or family further understands it is their responsibility to keep proper follow up.     Patient/family agree with plan, as outlined.         Miles Uriarte MD   Epilepsy and Neurodiagnostics.   Clinical  of Neurology [facility].   Diplomate in Neurology, Epilepsy, and Electrodiagnostic Medicine.   Office: [phone]  Fax: [phone]      BILLING DOCUMENTATION:     I have performed physical exam and reviewed and updated ROS and plan today 11/4/2019. In review of that note, there are no new changes except as documented above.     Counseling:  I spent greater than 50% time face-to-face time of a  total of 50 minutes visit. Over 50% of the time of the visit today was spent on counseling and or coordination of care wtih the patient and/or family, with greater than 50% of the total discussing my assessment and plan as stated above.

## 2019-11-04 NOTE — TELEPHONE ENCOUNTER
Per Dr Uriarte I called and spoke with João and informed her of the up coming 24 hr amb that is scheduled for January 2020. I confirmed the address and mailed the appt letter out.

## 2019-12-17 RX ORDER — PROPRANOLOL HYDROCHLORIDE 10 MG/1
TABLET ORAL
Qty: 180 TAB | Refills: 3 | Status: SHIPPED | OUTPATIENT
Start: 2019-12-17 | End: 2020-08-27

## 2020-01-06 ENCOUNTER — NON-PROVIDER VISIT (OUTPATIENT)
Dept: NEUROLOGY | Facility: MEDICAL CENTER | Age: 50
End: 2020-01-06
Payer: MEDICARE

## 2020-01-06 DIAGNOSIS — G40.109 LOCALIZATION-RELATED EPILEPSY (HCC): ICD-10-CM

## 2020-01-06 PROCEDURE — 95719 EEG PHYS/QHP EA INCR W/O VID: CPT | Performed by: PSYCHIATRY & NEUROLOGY

## 2020-01-06 PROCEDURE — 95708 EEG WO VID EA 12-26HR UNMNTR: CPT | Performed by: PSYCHIATRY & NEUROLOGY

## 2020-01-06 PROCEDURE — 95700 EEG CONT REC W/VID EEG TECH: CPT | Performed by: PSYCHIATRY & NEUROLOGY

## 2020-01-06 NOTE — PROCEDURES
24 HR AMBULATORY ELECTROENCEPHALOGRAM REPORT      Referring provider: Dr. Hardik Quezada.    DOS:   1/6/2020 (recorded for 23 hours and 40 minutes).    INDICATION:  Karri Hurtado 49 y.o. male presenting with history of seizures.    CURRENT ANTIEPILEPTIC REGIMEN: Lacosamide 150 mg twice daily.     TECHNIQUE: A 30-channel, 24 hrs ambulatory electroencephalogram (aEEG) was performed in accordance with the international 10-20 system. This digital study was reviewed in bipolar and referential montages. The recording examined the patient during wakeful, drowsy and sleep states.     DESCRIPTION OF THE RECORD:  During the awake state, background shows symmetrical 8 hz alpha activity posteriorly with amplitude of 70 mV.  There was reactivity with eye opening/closure.  Normal anterior-posterior gradient was noted with faster beta frequencies seen anteriorly.  During drowsiness, high-amplitude delta frequencies were seen.    During the sleep state, background shows diffuse high-amplitude 4-5 Hz delta activity.  Symmetrical high-amplitude sleep spindles and vertex sharp activities were seen in the central leads.    ACTIVATION PROCEDURES:   Not performed.    ICTAL AND/OR INTERICTAL FINDINGS:   There is slowing in the right frontal temporal region. Rare right frontotemporal sharps noted.  No seizures were recorded during the study.     EVENT(S): No clinical events reported.    EKG: sampling review of EKG recording demonstrated sinus rhythm.     INTERPRETATION:   This is an abnormal 24 hours ambulatory electroencephalogram recording in the awake, drowsy and sleep state. There is slowing in the right frontal temporal region, suggestive of an underlying structural abnormality.  Rare right frontotemporal sharps noted, which increase the risk for seizures, and suggest underlying cortical irritability.  No events or seizures were captured during the study. Clinical and radiological correlation is  recommended.    Miles Uriarte MD   Epilepsy and Neurodiagnostics.   Clinical  of Neurology [facility].   Diplomate in Neurology, Epilepsy, and Electrodiagnostic Medicine.   Office: [phone]  Fax: [phone]

## 2020-01-07 ENCOUNTER — NON-PROVIDER VISIT (OUTPATIENT)
Dept: NEUROLOGY | Facility: MEDICAL CENTER | Age: 50
End: 2020-01-07
Payer: MEDICARE

## 2020-03-23 ENCOUNTER — TELEPHONE (OUTPATIENT)
Dept: NEUROLOGY | Facility: MEDICAL CENTER | Age: 50
End: 2020-03-23

## 2020-03-23 DIAGNOSIS — E55.9 VITAMIN D DEFICIENCY: ICD-10-CM

## 2020-03-23 RX ORDER — ERGOCALCIFEROL 1.25 MG/1
50000 CAPSULE ORAL
Qty: 4 CAP | Refills: 5 | Status: SHIPPED | OUTPATIENT
Start: 2020-03-23 | End: 2021-06-03 | Stop reason: SDUPTHER

## 2020-03-23 NOTE — TELEPHONE ENCOUNTER
Please let pt know, vit D is low, start weekly supplment for six months, rx to pharmacy. Thanks. Ra      Pt's cousin verbally understood.

## 2020-03-24 ENCOUNTER — APPOINTMENT (OUTPATIENT)
Dept: NEUROLOGY | Facility: MEDICAL CENTER | Age: 50
End: 2020-03-24
Payer: MEDICARE

## 2020-05-13 RX ORDER — LACOSAMIDE 150 MG/1
TABLET, FILM COATED ORAL
Qty: 60 TAB | OUTPATIENT
Start: 2020-05-13

## 2020-08-27 ENCOUNTER — OFFICE VISIT (OUTPATIENT)
Dept: NEUROLOGY | Facility: MEDICAL CENTER | Age: 50
End: 2020-08-27
Payer: MEDICARE

## 2020-08-27 VITALS
RESPIRATION RATE: 18 BRPM | DIASTOLIC BLOOD PRESSURE: 68 MMHG | SYSTOLIC BLOOD PRESSURE: 95 MMHG | OXYGEN SATURATION: 97 % | TEMPERATURE: 97.5 F | HEIGHT: 60 IN | HEART RATE: 70 BPM | BODY MASS INDEX: 27.7 KG/M2 | WEIGHT: 141.09 LBS

## 2020-08-27 DIAGNOSIS — G40.109 LOCALIZATION-RELATED EPILEPSY (HCC): ICD-10-CM

## 2020-08-27 DIAGNOSIS — G21.9 SECONDARY PARKINSONISM, UNSPECIFIED SECONDARY PARKINSONISM TYPE (HCC): ICD-10-CM

## 2020-08-27 DIAGNOSIS — R25.1 TREMORS OF NERVOUS SYSTEM: ICD-10-CM

## 2020-08-27 DIAGNOSIS — Z87.820 HISTORY OF TRAUMATIC BRAIN INJURY: ICD-10-CM

## 2020-08-27 DIAGNOSIS — Z13.31 SCREENING FOR DEPRESSION: ICD-10-CM

## 2020-08-27 DIAGNOSIS — F79 INTELLECTUAL DISABILITY: ICD-10-CM

## 2020-08-27 DIAGNOSIS — G93.89 ENCEPHALOMALACIA ON IMAGING STUDY: ICD-10-CM

## 2020-08-27 DIAGNOSIS — E55.9 VITAMIN D DEFICIENCY: ICD-10-CM

## 2020-08-27 DIAGNOSIS — R25.1 TREMOR: ICD-10-CM

## 2020-08-27 PROCEDURE — 99215 OFFICE O/P EST HI 40 MIN: CPT | Performed by: PSYCHIATRY & NEUROLOGY

## 2020-08-27 RX ORDER — LACOSAMIDE 150 MG/1
150 TABLET, FILM COATED ORAL 2 TIMES DAILY
COMMUNITY
Start: 2020-07-20 | End: 2020-08-27 | Stop reason: SDUPTHER

## 2020-08-27 RX ORDER — LACOSAMIDE 150 MG/1
150 TABLET, FILM COATED ORAL 2 TIMES DAILY
Qty: 60 TAB | Refills: 5 | Status: SHIPPED | OUTPATIENT
Start: 2020-08-27 | End: 2020-12-15 | Stop reason: SDUPTHER

## 2020-08-27 ASSESSMENT — PATIENT HEALTH QUESTIONNAIRE - PHQ9: CLINICAL INTERPRETATION OF PHQ2 SCORE: 0

## 2020-08-28 NOTE — PROGRESS NOTES
Chief Complaint   Patient presents with   • Follow-Up     Localization-related epilepsy        Problem List Items Addressed This Visit     Tremor    Relevant Orders    CBC WITH DIFFERENTIAL    Comp Metabolic Panel    VITAMIN D,25 HYDROXY      Other Visit Diagnoses     Localization-related epilepsy (HCC)        Relevant Medications    VIMPAT 150 MG Tab    Other Relevant Orders    CBC WITH DIFFERENTIAL    Comp Metabolic Panel    VITAMIN D,25 HYDROXY    Tremors of nervous system        Relevant Orders    CBC WITH DIFFERENTIAL    Comp Metabolic Panel    VITAMIN D,25 HYDROXY    History of traumatic brain injury        Relevant Orders    CBC WITH DIFFERENTIAL    Comp Metabolic Panel    VITAMIN D,25 HYDROXY    Secondary parkinsonism, unspecified secondary Parkinsonism type (HCC)        Relevant Medications    carbidopa-levodopa (SINEMET)  MG Tab    Other Relevant Orders    CBC WITH DIFFERENTIAL    Comp Metabolic Panel    VITAMIN D,25 HYDROXY    Intellectual disability        Relevant Orders    CBC WITH DIFFERENTIAL    Comp Metabolic Panel    VITAMIN D,25 HYDROXY    Screening for depression        Relevant Orders    CBC WITH DIFFERENTIAL    Comp Metabolic Panel    VITAMIN D,25 HYDROXY    Encephalomalacia on imaging study        Relevant Orders    CBC WITH DIFFERENTIAL    Comp Metabolic Panel    VITAMIN D,25 HYDROXY    Vitamin D deficiency        Relevant Orders    CBC WITH DIFFERENTIAL    Comp Metabolic Panel    VITAMIN D,25 HYDROXY          Interim history:  FarhanDustin Hurtado returns to the office in follow-up, in the company of family.  The patient remains seizure-free on Vimpat 150 mg twice a day, denies any side effects, he is fully compliant.  His mood is good, he does not have depression and is not suicidal.  He does not drive.  He requires assistance from the family.  He continues to exhibit tremors, and abnormal gait.  A trial of propanolol was not effective, they would like to come off of it.   They are inquiring whether there is any other medications taken tried for tremors and abnormality of gait.  No recent falls.  Denies a history of hallucinations or memory loss.    He has no other complaints today.     Patient completed vitamin D supplementation.    Past medical history:   Past Medical History:   Diagnosis Date   • Seizure (HCC)        Past surgical history:   History reviewed. No pertinent surgical history.    Family history:   History reviewed. No pertinent family history.    Social history:   Social History     Socioeconomic History   • Marital status: Single     Spouse name: Not on file   • Number of children: Not on file   • Years of education: Not on file   • Highest education level: Not on file   Occupational History   • Not on file   Social Needs   • Financial resource strain: Not on file   • Food insecurity     Worry: Not on file     Inability: Not on file   • Transportation needs     Medical: Not on file     Non-medical: Not on file   Tobacco Use   • Smoking status: Never Smoker   • Smokeless tobacco: Never Used   Substance and Sexual Activity   • Alcohol use: No   • Drug use: No   • Sexual activity: Not on file   Lifestyle   • Physical activity     Days per week: Not on file     Minutes per session: Not on file   • Stress: Not on file   Relationships   • Social connections     Talks on phone: Not on file     Gets together: Not on file     Attends Protestant service: Not on file     Active member of club or organization: Not on file     Attends meetings of clubs or organizations: Not on file     Relationship status: Not on file   • Intimate partner violence     Fear of current or ex partner: Not on file     Emotionally abused: Not on file     Physically abused: Not on file     Forced sexual activity: Not on file   Other Topics Concern   • Not on file   Social History Narrative   • Not on file       Current medications:   Current Outpatient Medications   Medication   • VIMPAT 150 MG Tab   •  carbidopa-levodopa (SINEMET)  MG Tab   • vitamin D, Ergocalciferol, (DRISDOL) 1.25 MG (54937 UT) Cap capsule     No current facility-administered medications for this visit.        Medication Allergy:  No Known Allergies      Review of systems:   As reviewed in today's history.    Physical examination:   Vitals:    08/27/20 0947   BP: (!) 95/68   BP Location: Left arm   Patient Position: Sitting   BP Cuff Size: Adult   Pulse: 70   Resp: 18   Temp: 36.4 °C (97.5 °F)   TempSrc: Temporal   SpO2: 97%   Weight: 64 kg (141 lb 1.5 oz)   Height: 1.524 m (5')     General: Patient in no acute distress, pleasant and cooperative.  HEENT: Normocephalic, no signs of acute trauma.   Neck: supple, no meningeal signs or carotid bruits. There is normal range of motion. No tenderness on exam.   Chest: clear to auscultation. No cough.   CV: RRR, no murmurs.   Skin: no signs of acute rashes or trauma.   Musculoskeletal: joints exhibit full range of motion, without any pain to palpation. There are no signs of joint or muscle swelling. There is no tenderness to deep palpation of muscles.   Psychiatric: No hallucinatory behavior. Denies symptoms of depression or suicidal ideation. Mood and affect appear normal on exam.     NEUROLOGICAL EXAM:   Mental status, orientation: Awake, alert and fully oriented.   Speech and language: speech is clear and fluent. The patient is able to name, repeat and comprehend.   Memory: There is intact recollection of recent and remote events.   Cranial nerve exam: Pupils are 3-4 mm bilaterally and equally reactive to light and accommodation. Visual fields are intact by confrontation. There is no nystagmus on primary or secondary gaze. Intact full EOM in all directions of gaze. Face appears symmetric. Sensation in the face is intact to light touch. Uvula is midline. Palate elevates symmetrically. Tongue is midline and without any signs of tongue biting or fasciculations.Sternocleidomastoid muscles exhibit  is normal strength bilaterally. Shoulder shrug is intact bilaterally.   Motor exam: Strength is 5/5 in all extremities. Tone is normal.  Exhibited pill-rolling tremors, most pronounced on the left hand, but also seen on the right hand to a lesser degree.  The patient also exhibited worsening of tremors with action.    Sensory exam reveals normal sense of light touch in all extremities.   Deep tendon reflexes: Brisk throughout. Plantar responses are flexor. There is no clonus.   Coordination: shows a normal finger-nose-finger. Normal rapidly alternating movements.   Gait: The patient was able to get up from a seated position without assistance, but required a second attempt.  He was mostly steady when walking, with difficulties turning.  He exhibited shorter steps than usual, and decreased arm swing bilaterally.  The patient had pill-rolling tremor in the left hand.      ANCILLARY DATA REVIEWED:       Lab Data Review:  Reviewed in chart.  History of vitamin D deficiency.    Records reviewed:   Chart reviewed.    Imaging:   MRI brain without, date of service 11/28/2018:  1.  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  2.  Age-related cerebral atrophy.  3.  Minimal periventricular and juxtacortical white matter changes consistent with chronic microvascular ischemic gliosis.  (I personally reviewed images with patient and family).        EEG:  Video EG, Dr. Day, date of service 3/20/2019:  INTERPRETATION:  This is a normal video EEG recording in the awake and briefly   drowsy state(s).  Clinical correlation is recommended.  An   isolated sharp discharges seen over the right anterior temporal   region, of unclear clinical significance, though if clinically   appropriate based on the patient's history, and ambulatory   tracing might be appropriate.     Ambulatory EEG, 1/6/2020:  This is an abnormal 24 hours ambulatory electroencephalogram   recording in the awake, drowsy and sleep state. There is slowing    in the right frontal temporal region, suggestive of an underlying   structural abnormality.  Rare right frontotemporal sharps noted,   which increase the risk for seizures, and suggest underlying   cortical irritability.  No events or seizures were captured   during the study. Clinical and radiological correlation is   recommended.         ASSESSMENT AND PLAN:  1. Localization-related epilepsy (HCC)  - CBC WITH DIFFERENTIAL; Future  - Comp Metabolic Panel; Future  - VITAMIN D,25 HYDROXY; Future  - VIMPAT 150 MG Tab; Take 150 mg by mouth 2 times a day for 180 days.  Dispense: 60 Tab; Refill: 5    2. Tremors of nervous system  - CBC WITH DIFFERENTIAL; Future  - Comp Metabolic Panel; Future  - VITAMIN D,25 HYDROXY; Future    3. History of traumatic brain injury  - CBC WITH DIFFERENTIAL; Future  - Comp Metabolic Panel; Future  - VITAMIN D,25 HYDROXY; Future    4. Secondary parkinsonism, unspecified secondary Parkinsonism type (HCC)  - CBC WITH DIFFERENTIAL; Future  - Comp Metabolic Panel; Future  - VITAMIN D,25 HYDROXY; Future  - carbidopa-levodopa (SINEMET)  MG Tab; Take 1 Tab by mouth 2 Times a Day.  Dispense: 60 Tab; Refill: 11    5. Intellectual disability  - CBC WITH DIFFERENTIAL; Future  - Comp Metabolic Panel; Future  - VITAMIN D,25 HYDROXY; Future    6. Screening for depression  - CBC WITH DIFFERENTIAL; Future  - Comp Metabolic Panel; Future  - VITAMIN D,25 HYDROXY; Future    7. Tremor  - CBC WITH DIFFERENTIAL; Future  - Comp Metabolic Panel; Future  - VITAMIN D,25 HYDROXY; Future    8. Encephalomalacia on imaging study  - CBC WITH DIFFERENTIAL; Future  - Comp Metabolic Panel; Future  - VITAMIN D,25 HYDROXY; Future    9. Vitamin D deficiency  - CBC WITH DIFFERENTIAL; Future  - Comp Metabolic Panel; Future  - VITAMIN D,25 HYDROXY; Future           CLINICAL DISCUSSION:   Structural epilepsy, he may be at risk for pharmaco-resistant epilepsy.  There has been some issues with compliance in the past.   However, the patient indicates he has been very compliant with the Vimpat.  Remains on Vimpat 150 mg twice a day.  He appears to have reached seizure-free normal monotherapy with Vimpat.  EEG abnormal.  Has family nearby, works at a [employer].  Does not drive.  Will remain on no driving status.    History of vitamin D deficiency, status post supplementation.  We will repeat levels of vitamin D and other labs.    He is no longer on Dilantin.       The patient suffers from tremors and parkinsonism. There is no evidence of cerebellar atrophy on imaging of the brain.  The tremor and parkinsonism are likely posttraumatic, keeping his childhood severe traumatic brain injury, likely aggravated by Dilantin, however now significantly improved off Dilantin.  Also no benefit with a trial of propanolol.  We discussed possibly using a dopamine agonist versus levodopa carbidopa supplementation.  They would like to try Sinemet, the possible side effects were discussed including but not limited to worsening of mood, hallucinations and GI side effects.  He will take 1 tablet p.o. twice daily.  The patient will keep us posted of any improvement, worsening or side effects.    Compliance discussed at length.         FOLLOW-UP:   Return in about 3 months (around 11/27/2020).      EDUCATION AND COUNSELING:  -Education was provided to the patient and/or family regarding diagnosis and prognosis. The chronic and unpredictable nature of the condition were discussed. There is increased risk for additional events, which may carry potential for significant injuries and death. Discussed frequent seizure triggers: sleep deprivation, medication non-compliance, use of illegal drugs/alcohol, stress, and others.   -We reviewed in detail the current antiepileptic regimen. Potential side effects of antiepileptics were discussed at length, including but no limited to: hypersensitivity reactions (rash and others, some of which can be fatal), visual  field changes (some of which may be irreversible), glaucoma, diplopia, kidney stones, osteopenia/osteoporosis/bone fractures, hyperthermia/anhydrosis, hyponatremia, tremors/abnormal movements, ataxia, dizziness, fatigue, increased risk for falls, risk for cardiac arrhythmias/syncope, gastrointestinal side effects(hepatitis, pancreatitis, gastritis, ulcers), gingival hypertrophy/bleeding, drowsiness, sedation, anxiety/nervousness, increased risk for suicide, increased risk for depression, and psychosis.   -We also reviewed drug-drug interactions and their potential effect on seizure control and medication side effects.    -Recommend chronic vitamin D supplementation and regular exercise (if not contraindicated).   -Patient/family educated on risk for SUDEP (Sudden Death in Epilepsy). Counseling was provided on the importance of strict medication and follow up compliance. The patient/family understand the risks associated with non-adherence with the medical plan as outlined, including but not limited to an increased risk for breakthrough seizures, which may contribute to injuries, disability, status epilepticus, and even death.   -Counseling was also provided on potential effects of alcohol and other drugs, which may lower seizure threshold and/or affect the metabolism of antiepileptic drugs. We recommend avoidance of alcohol and illegal drugs.  Denies use.  -Avoid sleep deprivation.   -We extensively discussed the aspects related to safety in drivers who suffer from epilepsy. The patient is encourage to report to the Division of Motor Vehicles of any condition and/or spells related to confusion, disorientation, and/or loss of awareness and/or loss of consciousness; as these may pose a safety issue if they occur while operating a motor vehicle. The patient and/or family are ultimately responsible for exercising caution and abiding to regulations in place.  Does not drive.  -Other seizure precautions were discussed at  length, including no diving, no skydiving, no climbing or exposure to unprotected heights, no unsupervised swimming, no Jacuzzi or bathing in bathtubs or deep bodies of water. The patient/family have been advised about risks for operating any machinery while suffering from seizures / syncope / epilepsy and/or while taking antiepileptic drugs.   -The patient understands and agrees that due to the complexity of his/her diagnosis, results of any testing and further recommendations will typically be discussed/made during a face to face encounter in my office. The patient and/or family further understands it is their responsibility to keep proper follow up.     Patient/family agree with plan, as outlined.         Miles Uriarte MD   Epilepsy and Neurodiagnostics.   Clinical  of Neurology [facility].   Diplomate in Neurology, Epilepsy, and Electrodiagnostic Medicine.   Office: [phone]  Fax: [phone]      BILLING DOCUMENTATION:     I have performed physical exam and reviewed and updated ROS and plan today 8/27/2020. In review of that note, there are no new changes except as documented above.     Counseling:  I spent greater than 50% time face-to-face time of a total of 46 minutes visit. Over 50% of the time of the visit today was spent on counseling and or coordination of care wtih the patient and/or family, with greater than 50% of the total discussing my assessment and plan as stated above.

## 2020-12-15 DIAGNOSIS — G40.109 LOCALIZATION-RELATED EPILEPSY (HCC): ICD-10-CM

## 2020-12-15 RX ORDER — LACOSAMIDE 150 MG/1
150 TABLET, FILM COATED ORAL 2 TIMES DAILY
Qty: 60 TAB | Refills: 5 | Status: SHIPPED | OUTPATIENT
Start: 2020-12-15 | End: 2021-02-04 | Stop reason: SDUPTHER

## 2021-02-04 ENCOUNTER — OFFICE VISIT (OUTPATIENT)
Dept: NEUROLOGY | Facility: MEDICAL CENTER | Age: 51
End: 2021-02-04
Attending: PSYCHIATRY & NEUROLOGY
Payer: MEDICARE

## 2021-02-04 VITALS
HEART RATE: 86 BPM | HEIGHT: 61 IN | SYSTOLIC BLOOD PRESSURE: 100 MMHG | DIASTOLIC BLOOD PRESSURE: 71 MMHG | TEMPERATURE: 98.4 F | RESPIRATION RATE: 16 BRPM | WEIGHT: 138.89 LBS | BODY MASS INDEX: 26.22 KG/M2 | OXYGEN SATURATION: 96 %

## 2021-02-04 DIAGNOSIS — G40.109 LOCALIZATION-RELATED EPILEPSY (HCC): ICD-10-CM

## 2021-02-04 DIAGNOSIS — R25.1 TREMOR: ICD-10-CM

## 2021-02-04 DIAGNOSIS — R71.8 MICROCYTOSIS: ICD-10-CM

## 2021-02-04 DIAGNOSIS — R79.89 ABNORMAL LFTS: ICD-10-CM

## 2021-02-04 DIAGNOSIS — Z87.820 HISTORY OF TRAUMATIC BRAIN INJURY: ICD-10-CM

## 2021-02-04 DIAGNOSIS — E55.9 VITAMIN D DEFICIENCY: ICD-10-CM

## 2021-02-04 DIAGNOSIS — G21.9 SECONDARY PARKINSONISM, UNSPECIFIED SECONDARY PARKINSONISM TYPE (HCC): ICD-10-CM

## 2021-02-04 DIAGNOSIS — F79 INTELLECTUAL DISABILITY: ICD-10-CM

## 2021-02-04 DIAGNOSIS — Z13.31 SCREENING FOR DEPRESSION: ICD-10-CM

## 2021-02-04 DIAGNOSIS — G93.89 ENCEPHALOMALACIA ON IMAGING STUDY: ICD-10-CM

## 2021-02-04 PROCEDURE — 99211 OFF/OP EST MAY X REQ PHY/QHP: CPT | Performed by: PSYCHIATRY & NEUROLOGY

## 2021-02-04 PROCEDURE — 99215 OFFICE O/P EST HI 40 MIN: CPT | Performed by: PSYCHIATRY & NEUROLOGY

## 2021-02-04 RX ORDER — LACOSAMIDE 150 MG/1
150 TABLET, FILM COATED ORAL 2 TIMES DAILY
Qty: 180 TAB | Refills: 1 | Status: SHIPPED | OUTPATIENT
Start: 2021-02-04 | End: 2021-06-03 | Stop reason: SDUPTHER

## 2021-02-04 ASSESSMENT — PATIENT HEALTH QUESTIONNAIRE - PHQ9: CLINICAL INTERPRETATION OF PHQ2 SCORE: 0

## 2021-02-05 NOTE — PROGRESS NOTES
Chief Complaint   Patient presents with   • Follow-Up     Localization-related epilepsy        Problem List Items Addressed This Visit     Tremor      Other Visit Diagnoses     Secondary parkinsonism, unspecified secondary Parkinsonism type (HCC)        Relevant Medications    carbidopa-levodopa (SINEMET)  MG Tab    Localization-related epilepsy (HCC)        Relevant Medications    VIMPAT 150 MG Tab    History of traumatic brain injury        Intellectual disability        Screening for depression        Encephalomalacia on imaging study        Microcytosis        Vitamin D deficiency        Abnormal LFTs              Interim history:  Karri Hurtado returns to the clinic in follow-up, in the company of his sister.  The patient has been doing great on Vimpat 150 mg twice a day, but unfortunately ran out of the Vimpat as his pharmacy indicated that he had no refills.  Unfortunately, had a generalized tonic-clonic seizure about 2 days ago without medication.  Otherwise he was doing fine.  He denies any side effects to the Vimpat.  His mood remains good, he is not depressed or suicidal.  For his tremors, he indicates that Sinemet has helped him with the tremors, and would like to increase the dose.  He denies any side effects to the Sinemet.  He does not drive.  He recently had blood work done.      Past medical history:   Past Medical History:   Diagnosis Date   • Seizure (HCC)        Past surgical history:   History reviewed. No pertinent surgical history.    Family history:   History reviewed. No pertinent family history.    Social history:   Social History     Socioeconomic History   • Marital status: Single     Spouse name: Not on file   • Number of children: Not on file   • Years of education: Not on file   • Highest education level: Not on file   Occupational History   • Not on file   Social Needs   • Financial resource strain: Not on file   • Food insecurity     Worry: Not on file      "Inability: Not on file   • Transportation needs     Medical: Not on file     Non-medical: Not on file   Tobacco Use   • Smoking status: Never Smoker   • Smokeless tobacco: Never Used   Substance and Sexual Activity   • Alcohol use: No   • Drug use: No   • Sexual activity: Not on file   Lifestyle   • Physical activity     Days per week: Not on file     Minutes per session: Not on file   • Stress: Not on file   Relationships   • Social connections     Talks on phone: Not on file     Gets together: Not on file     Attends Gnosticist service: Not on file     Active member of club or organization: Not on file     Attends meetings of clubs or organizations: Not on file     Relationship status: Not on file   • Intimate partner violence     Fear of current or ex partner: Not on file     Emotionally abused: Not on file     Physically abused: Not on file     Forced sexual activity: Not on file   Other Topics Concern   • Not on file   Social History Narrative   • Not on file       Current medications:   Current Outpatient Medications   Medication   • carbidopa-levodopa (SINEMET)  MG Tab   • VIMPAT 150 MG Tab   • vitamin D, Ergocalciferol, (DRISDOL) 1.25 MG (51334 UT) Cap capsule     No current facility-administered medications for this visit.        Medication Allergy:  No Known Allergies      Review of systems:   As reviewed in today's history.    Physical examination:   Vitals:    02/04/21 1140   BP: 100/71   BP Location: Left arm   Patient Position: Sitting   BP Cuff Size: Adult   Pulse: 86   Resp: 16   Temp: 36.9 °C (98.4 °F)   TempSrc: Temporal   SpO2: 96%   Weight: 63 kg (138 lb 14.2 oz)   Height: 1.549 m (5' 1\")     General: Patient in no acute distress, pleasant and cooperative.  HEENT: Normocephalic, no signs of acute trauma.   Neck: supple, no meningeal signs or carotid bruits. There is normal range of motion. No tenderness on exam.   Chest: clear to auscultation. No cough.   CV: RRR, no murmurs.   Skin: no " signs of acute rashes or trauma.   Musculoskeletal: joints exhibit full range of motion, without any pain to palpation. There are no signs of joint or muscle swelling. There is no tenderness to deep palpation of muscles.   Psychiatric: No hallucinatory behavior. Denies symptoms of depression or suicidal ideation. Mood and affect appear normal on exam.     NEUROLOGICAL EXAM:   Mental status, orientation: Awake, alert and fully oriented.   Speech and language: speech is clear and fluent. The patient is able to name, repeat and comprehend.   Memory: There is intact recollection of recent and remote events.   Cranial nerve exam: Pupils are 3-4 mm bilaterally and equally reactive to light and accommodation. Visual fields are intact by confrontation. There is no nystagmus on primary or secondary gaze. Intact full EOM in all directions of gaze. Face appears symmetric. Sensation in the face is intact to light touch. Uvula is midline. Palate elevates symmetrically. Tongue is midline and without any signs of tongue biting or fasciculations.Sternocleidomastoid muscles exhibit is normal strength bilaterally. Shoulder shrug is intact bilaterally.   Motor exam: Strength is 5/5 in all extremities. Tone is normal.  Exhibited pill-rolling tremors, most pronounced on the left hand, but also seen on the right hand to a lesser degree.  The patient also exhibited worsening of tremors with action.   Sensory exam reveals normal sense of light touch in all extremities.   Deep tendon reflexes:  brisk throughout. Plantar responses are flexor. There is no clonus.   Coordination: shows a normal finger-nose-finger. Normal rapidly alternating movements.   Gait: The patient was able to get up from seated position on first attempt without requiring assistance. He was mostly steady when walking, with difficulties turning.  He exhibited shorter steps than usual, and decreased arm swing bilaterally.  The patient had pill-rolling tremor in the left  hand.      ANCILLARY DATA REVIEWED:       Lab Data Review:  Reviewed in chart.  Vitamin D level was good.  His LFTs were good.    Records reviewed:   Chart reviewed.          Imaging:   MRI brain without, date of service 11/28/2018:  1.  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  2.  Age-related cerebral atrophy.  3.  Minimal periventricular and juxtacortical white matter changes consistent with chronic microvascular ischemic gliosis.  (I personally reviewed images with patient and family).        EEG:  Video EG, Dr. Day, date of service 3/20/2019:  INTERPRETATION:  This is a normal video EEG recording in the awake and briefly   drowsy state(s).  Clinical correlation is recommended.  An   isolated sharp discharges seen over the right anterior temporal   region, of unclear clinical significance, though if clinically   appropriate based on the patient's history, and ambulatory   tracing might be appropriate.     Ambulatory EEG, 1/6/2020:  This is an abnormal 24 hours ambulatory electroencephalogram   recording in the awake, drowsy and sleep state. There is slowing   in the right frontal temporal region, suggestive of an underlying   structural abnormality.  Rare right frontotemporal sharps noted,   which increase the risk for seizures, and suggest underlying   cortical irritability.  No events or seizures were captured   during the study. Clinical and radiological correlation is   recommended.             ASSESSMENT AND PLAN:  1. Secondary parkinsonism, unspecified secondary Parkinsonism type (HCC)  - carbidopa-levodopa (SINEMET)  MG Tab; Take 1 Tab by mouth 3 times a day.  Dispense: 270 Tab; Refill: 3    2. Localization-related epilepsy (HCC)  - VIMPAT 150 MG Tab; Take 150 mg by mouth 2 times a day for 180 days.  Dispense: 180 Tab; Refill: 1    3. History of traumatic brain injury    4. Intellectual disability    5. Screening for depression    6. Encephalomalacia on imaging study    7.  Microcytosis    8. Vitamin D deficiency    9. Tremor    10. Abnormal LFTs       CLINICAL DISCUSSION:   Structural epilepsy, he may be at risk for pharmaco-resistant epilepsy.  There has been some issues with compliance in the past.  However, the patient indicates he has been very compliant with the Vimpat, except he ran out of it two weeks ago, and had a GTC seizure this week, no injuries. We checked and he has refills and should have not ran out. My MA called pharmacy and they indicated there were going to fill rx today and don't know what happened.  Remains on Vimpat 150 mg twice a day, gave samples today and took one dose while in the office.  He appears to have reached seizure-free normal monotherapy with Vimpat.  EEG was abnormal.  Has family nearby, works at a [employer].  Does not drive.  Will remain on no driving status. He agrees.      History of vitamin D deficiency, status post supplementation.  Level is good now.      He is no longer on Dilantin.       The patient suffers from tremors and parkinsonism. There is no evidence of cerebellar atrophy on imaging of the brain.  The tremor and parkinsonism are likely posttraumatic, keeping his childhood severe traumatic brain injury, likely aggravated by Dilantin, however now significantly improved off Dilantin.  Also no benefit with a trial of propanolol.  We discussed possibly using a dopamine agonist versus levodopa carbidopa supplementation.  They wanted to try Sinemet, and is taking bid, would like to increase to tid as he had some benefits. the possible side effects were discussed including but not limited to worsening of mood, hallucinations and GI side effects. No side effects so far.  The patient will keep us posted of any improvement, worsening or side effects.     Compliance discussed at length.         FOLLOW-UP:   Return in about 6 months (around 8/4/2021).      EDUCATION AND COUNSELING:  -Education was provided to the patient and/or family  regarding diagnosis and prognosis. The chronic and unpredictable nature of the condition were discussed. There is increased risk for additional events, which may carry potential for significant injuries and death. Discussed frequent seizure triggers: sleep deprivation, medication non-compliance, use of illegal drugs/alcohol, stress, and others.   -We reviewed in detail the current antiepileptic regimen. Potential side effects of antiepileptics were discussed at length, including but no limited to: hypersensitivity reactions (rash and others, some of which can be fatal), visual field changes (some of which may be irreversible), glaucoma, diplopia, kidney stones, osteopenia/osteoporosis/bone fractures, hyperthermia/anhydrosis, hyponatremia, tremors/abnormal movements, ataxia, dizziness, fatigue, increased risk for falls, risk for cardiac arrhythmias and syncope, weight changes, hair loss, gastrointestinal side effects(hepatitis, pancreatitis, gastritis, ulcers, gingival hypertrophy/bleeding, drowsiness, sedation, memory loss, trouble finding words, anxiety/nervousness, increased risk for suicide, increased risk for depression, and psychosis.   -We also reviewed drug-drug interactions and their potential effect on seizure control and medication side effects.    -Recommend chronic vitamin D supplementation and regular exercise (if not contraindicated).   -Patient/family educated on risk for SUDEP (Sudden Death in Epilepsy). Counseling was provided on the importance of strict medication and follow up compliance. The patient/family understand the risks associated with non-adherence with the medical plan as outlined, including but not limited to an increased risk for breakthrough seizures, which may contribute to injuries, disability, status epilepticus, and even death.   -Counseling was also provided on potential effects of alcohol and other drugs, which may lower seizure threshold and/or affect the metabolism of  antiepileptic drugs. We recommend avoidance of alcohol and illegal drugs.  Denies use.  -Avoid sleep deprivation.   -We extensively discussed the aspects related to safety in drivers who suffer from epilepsy. The patient is encourage to report to the Division of Motor Vehicles of any condition and/or spells related to confusion, disorientation, and/or loss of awareness and/or loss of consciousness; as these may pose a safety issue if they occur while operating a motor vehicle. The patient and/or family are ultimately responsible for exercising caution and abiding to regulations in place.  Does not drive.  -Other seizure precautions were discussed at length, including no diving, no skydiving, no climbing or exposure to unprotected heights, no unsupervised swimming, no Jacuzzi or bathing in bathtubs or deep bodies of water. The patient/family have been advised about risks for operating any machinery while suffering from seizures / syncope / epilepsy and/or while taking antiepileptic drugs.   -The patient understands and agrees that due to the complexity of his/her diagnosis, results of any testing and further recommendations will typically be discussed/made during a face to face encounter in my office. The patient and/or family further understands it is their responsibility to keep proper follow up.     Patient/family agree with plan, as outlined.         Miles Uriarte MD   Epilepsy and Neurodiagnostics.   Clinical  of Neurology [facility].   Diplomate in Neurology, Epilepsy, and Electrodiagnostic Medicine.   Office: [phone]  Fax: [phone]      BILLING DOCUMENTATION:     I have performed physical exam and reviewed and updated ROS and plan today 2/4/2021. In review of that note, there are no new changes except as documented above.     Counseling:  I spent greater than 50% time face-to-face time of a total of 44 minutes visit. Over 50% of the  time of the visit today was spent on counseling and or coordination of care wtih the patient and/or family, with greater than 50% of the total discussing my assessment and plan as stated above.

## 2021-06-03 ENCOUNTER — OFFICE VISIT (OUTPATIENT)
Dept: NEUROLOGY | Facility: MEDICAL CENTER | Age: 51
End: 2021-06-03
Attending: PSYCHIATRY & NEUROLOGY
Payer: MEDICARE

## 2021-06-03 VITALS
WEIGHT: 132.06 LBS | HEART RATE: 80 BPM | TEMPERATURE: 95.9 F | BODY MASS INDEX: 24.95 KG/M2 | SYSTOLIC BLOOD PRESSURE: 116 MMHG | RESPIRATION RATE: 14 BRPM | OXYGEN SATURATION: 96 % | DIASTOLIC BLOOD PRESSURE: 80 MMHG

## 2021-06-03 DIAGNOSIS — R25.1 TREMORS OF NERVOUS SYSTEM: ICD-10-CM

## 2021-06-03 DIAGNOSIS — Z87.820 HISTORY OF TRAUMATIC BRAIN INJURY: ICD-10-CM

## 2021-06-03 DIAGNOSIS — R25.1 TREMOR: ICD-10-CM

## 2021-06-03 DIAGNOSIS — Z13.31 SCREENING FOR DEPRESSION: ICD-10-CM

## 2021-06-03 DIAGNOSIS — E55.9 VITAMIN D DEFICIENCY: ICD-10-CM

## 2021-06-03 DIAGNOSIS — G21.9 SECONDARY PARKINSONISM, UNSPECIFIED SECONDARY PARKINSONISM TYPE (HCC): ICD-10-CM

## 2021-06-03 DIAGNOSIS — G40.109 LOCALIZATION-RELATED EPILEPSY (HCC): ICD-10-CM

## 2021-06-03 DIAGNOSIS — G93.89 ENCEPHALOMALACIA ON IMAGING STUDY: ICD-10-CM

## 2021-06-03 PROCEDURE — 99214 OFFICE O/P EST MOD 30 MIN: CPT | Performed by: PSYCHIATRY & NEUROLOGY

## 2021-06-03 PROCEDURE — 99211 OFF/OP EST MAY X REQ PHY/QHP: CPT | Performed by: PSYCHIATRY & NEUROLOGY

## 2021-06-03 RX ORDER — ERGOCALCIFEROL 1.25 MG/1
50000 CAPSULE ORAL
Qty: 4 CAPSULE | Refills: 5 | Status: SHIPPED | OUTPATIENT
Start: 2021-06-03 | End: 2021-11-25

## 2021-06-03 RX ORDER — LACOSAMIDE 150 MG/1
150 TABLET, FILM COATED ORAL 2 TIMES DAILY
Qty: 180 TABLET | Refills: 1 | Status: SHIPPED | OUTPATIENT
Start: 2021-06-03 | End: 2021-09-02 | Stop reason: SDUPTHER

## 2021-06-03 NOTE — PROGRESS NOTES
Chief Complaint   Patient presents with   • Follow-Up     Secondary parkinsonism, unspecified secondary Parkinsonism type        Problem List Items Addressed This Visit     Tremor    Relevant Orders    CBC WITH DIFFERENTIAL    Comp Metabolic Panel    VITAMIN D,25 HYDROXY      Other Visit Diagnoses     Secondary parkinsonism, unspecified secondary Parkinsonism type (HCC)        Relevant Medications    carbidopa-levodopa (SINEMET)  MG Tab    Other Relevant Orders    CBC WITH DIFFERENTIAL    Comp Metabolic Panel    VITAMIN D,25 HYDROXY    Localization-related epilepsy (HCC)        Relevant Medications    VIMPAT 150 MG Tab    Other Relevant Orders    CBC WITH DIFFERENTIAL    Comp Metabolic Panel    VITAMIN D,25 HYDROXY    Vitamin D deficiency        Relevant Medications    vitamin D, Ergocalciferol, (DRISDOL) 1.25 MG (73250 UT) Cap capsule    Other Relevant Orders    CBC WITH DIFFERENTIAL    Comp Metabolic Panel    VITAMIN D,25 HYDROXY    History of traumatic brain injury        Relevant Orders    CBC WITH DIFFERENTIAL    Comp Metabolic Panel    VITAMIN D,25 HYDROXY    Encephalomalacia on imaging study        Relevant Orders    CBC WITH DIFFERENTIAL    Comp Metabolic Panel    VITAMIN D,25 HYDROXY    Screening for depression        Relevant Orders    CBC WITH DIFFERENTIAL    Comp Metabolic Panel    VITAMIN D,25 HYDROXY    Tremors of nervous system        Relevant Orders    CBC WITH DIFFERENTIAL    Comp Metabolic Panel    VITAMIN D,25 HYDROXY          Interim history:  FarhanDustin Hurtado returns the clinic in follow-up.  He is doing very well, remains seizure-free, continues on Vimpat 150 mg twice a day, denies any side effects.  He is also on Sinemet 3 times a day, found it beneficial, continues to exhibit tremors, but appears to be better, denies any side effects, does not want to make any changes.  Mood is good, denies being depressed, suicidal homicidal.  He does not drive.  Continues on vitamin D  supplementation weekly, does not want to discontinue, states vitamin D makes him feel well.  No recent blood work.  Denies any other issues at this time.  Wants renewal of all his meds.          Past medical history:   Past Medical History:   Diagnosis Date   • Seizure (HCC)        Past surgical history:   History reviewed. No pertinent surgical history.    Family history:   History reviewed. No pertinent family history.    Social history:   Social History     Socioeconomic History   • Marital status: Single     Spouse name: Not on file   • Number of children: Not on file   • Years of education: Not on file   • Highest education level: Not on file   Occupational History   • Not on file   Tobacco Use   • Smoking status: Never Smoker   • Smokeless tobacco: Never Used   Vaping Use   • Vaping Use: Never used   Substance and Sexual Activity   • Alcohol use: No   • Drug use: No   • Sexual activity: Not on file   Other Topics Concern   • Not on file   Social History Narrative   • Not on file     Social Determinants of Health     Financial Resource Strain:    • Difficulty of Paying Living Expenses:    Food Insecurity:    • Worried About Running Out of Food in the Last Year:    • Ran Out of Food in the Last Year:    Transportation Needs:    • Lack of Transportation (Medical):    • Lack of Transportation (Non-Medical):    Physical Activity:    • Days of Exercise per Week:    • Minutes of Exercise per Session:    Stress:    • Feeling of Stress :    Social Connections:    • Frequency of Communication with Friends and Family:    • Frequency of Social Gatherings with Friends and Family:    • Attends Denominational Services:    • Active Member of Clubs or Organizations:    • Attends Club or Organization Meetings:    • Marital Status:    Intimate Partner Violence:    • Fear of Current or Ex-Partner:    • Emotionally Abused:    • Physically Abused:    • Sexually Abused:        Current medications:   Current Outpatient Medications    Medication   • carbidopa-levodopa (SINEMET)  MG Tab   • VIMPAT 150 MG Tab   • vitamin D, Ergocalciferol, (DRISDOL) 1.25 MG (44428 UT) Cap capsule     No current facility-administered medications for this visit.       Medication Allergy:  No Known Allergies      Review of systems:   As reviewed in today's history.    Physical examination:   Vitals:    06/03/21 1133   BP: 116/80   BP Location: Left arm   Patient Position: Sitting   BP Cuff Size: Adult   Pulse: 80   Resp: 14   Temp: (!) 35.5 °C (95.9 °F)   TempSrc: Temporal   SpO2: 96%   Weight: 59.9 kg (132 lb 0.9 oz)     General: Patient in no acute distress, pleasant and cooperative.  HEENT: Normocephalic, no signs of acute trauma.   Neck: supple, no meningeal signs or carotid bruits. There is normal range of motion. No tenderness on exam.   Chest: clear to auscultation. No cough.   CV: RRR, no murmurs.   Skin: no signs of acute rashes or trauma.   Musculoskeletal: joints exhibit full range of motion, without any pain to palpation. There are no signs of joint or muscle swelling. There is no tenderness to deep palpation of muscles.   Psychiatric: No hallucinatory behavior. Denies symptoms of depression or suicidal ideation. Mood and affect appear normal on exam.     NEUROLOGICAL EXAM:   Mental status, orientation: Awake, alert and fully oriented.   Speech and language: speech is clear and fluent. The patient is able to name, repeat and comprehend.   Memory: There is intact recollection of recent and remote events.   Cranial nerve exam: Pupils are 3-4 mm bilaterally and equally reactive to light and accommodation. Visual fields are intact by confrontation. There is no nystagmus on primary or secondary gaze. Intact full EOM in all directions of gaze. Face appears symmetric. Sensation in the face is intact to light touch. Uvula is midline. Palate elevates symmetrically. Tongue is midline and without any signs of tongue biting or  fasciculations.Sternocleidomastoid muscles exhibit is normal strength bilaterally. Shoulder shrug is intact bilaterally.   Motor exam: Strength is 5/5 in all extremities. Tone is normal.  Exhibited pill-rolling tremors, most pronounced on the left hand, but also on the right to a lesser degree.  Worsening of tremor was noted with action.    Sensory exam reveals normal sense of light touch in all extremities.   Deep tendon reflexes: Brisk throughout. Plantar responses are flexor. There is no clonus.   Coordination: shows a normal finger-nose-finger. Normal rapidly alternating movements.   Gait: The patient was able to get up from seated position on first attempt without requiring assistance. Found to be steady when walking.  Works in short steps, decreased arm swing bilaterally, pill rolling tremor on the left hand while walking.        ANCILLARY DATA REVIEWED:       Lab Data Review:  Reviewed in chart.    Records reviewed:   Chart reviewed.    Imaging:   MRI brain without, date of service 11/28/2018:  1.  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  2.  Age-related cerebral atrophy.  3.  Minimal periventricular and juxtacortical white matter changes consistent with chronic microvascular ischemic gliosis.  (I personally reviewed images with patient and family).        EEG:  Video EG, Dr. Day, date of service 3/20/2019:  INTERPRETATION:  This is a normal video EEG recording in the awake and briefly   drowsy state(s).  Clinical correlation is recommended.  An   isolated sharp discharges seen over the right anterior temporal   region, of unclear clinical significance, though if clinically   appropriate based on the patient's history, and ambulatory   tracing might be appropriate.     Ambulatory EEG, 1/6/2020:  This is an abnormal 24 hours ambulatory electroencephalogram   recording in the awake, drowsy and sleep state. There is slowing   in the right frontal temporal region, suggestive of an underlying    structural abnormality.  Rare right frontotemporal sharps noted,   which increase the risk for seizures, and suggest underlying   cortical irritability.  No events or seizures were captured   during the study. Clinical and radiological correlation is   recommended.          ASSESSMENT AND PLAN:  1. Secondary parkinsonism, unspecified secondary Parkinsonism type (HCC)  - carbidopa-levodopa (SINEMET)  MG Tab; Take 1 tablet by mouth 3 times a day.  Dispense: 270 tablet; Refill: 3  - CBC WITH DIFFERENTIAL; Future  - Comp Metabolic Panel; Future  - VITAMIN D,25 HYDROXY; Future    2. Localization-related epilepsy (HCC)  - VIMPAT 150 MG Tab; Take 150 mg by mouth 2 times a day for 180 days.  Dispense: 180 tablet; Refill: 1  - CBC WITH DIFFERENTIAL; Future  - Comp Metabolic Panel; Future  - VITAMIN D,25 HYDROXY; Future    3. Vitamin D deficiency  - vitamin D, Ergocalciferol, (DRISDOL) 1.25 MG (90619 UT) Cap capsule; Take 1 capsule by mouth every 7 days.  Dispense: 4 capsule; Refill: 5  - CBC WITH DIFFERENTIAL; Future  - Comp Metabolic Panel; Future  - VITAMIN D,25 HYDROXY; Future    4. History of traumatic brain injury  - CBC WITH DIFFERENTIAL; Future  - Comp Metabolic Panel; Future  - VITAMIN D,25 HYDROXY; Future    5. Encephalomalacia on imaging study  - CBC WITH DIFFERENTIAL; Future  - Comp Metabolic Panel; Future  - VITAMIN D,25 HYDROXY; Future    6. Tremor  - CBC WITH DIFFERENTIAL; Future  - Comp Metabolic Panel; Future  - VITAMIN D,25 HYDROXY; Future    7. Screening for depression  - CBC WITH DIFFERENTIAL; Future  - Comp Metabolic Panel; Future  - VITAMIN D,25 HYDROXY; Future    8. Tremors of nervous system  - CBC WITH DIFFERENTIAL; Future  - Comp Metabolic Panel; Future  - VITAMIN D,25 HYDROXY; Future       CLINICAL DISCUSSION:   Structural epilepsy, he may be at risk for pharmaco-resistant epilepsy.  There has been some issues with compliance in the past.  Last seizure was in February 2021, after he ran out  of Vimpat.  Has been very compliant since, continues on Vimpat 150 mg twice a day, indicates pharmacy is providing medication on time.History of vitamin D deficiency, status post supplementation.  Level is good now, but wants to continue weekly supplementation, will repeat level, possibility for toxicity discussed with patient who refuses to make changes at this point.      He is no longer on Dilantin.       The patient suffers from tremors and parkinsonism. There is no evidence of cerebellar atrophy on imaging of the brain.  The tremor and parkinsonism are likely posttraumatic, keeping his childhood severe traumatic brain injury, likely aggravated by Dilantin, however now significantly improved off Dilantin.  Also no benefit with a trial of propanolol.  We discussed possibly using a dopamine agonist versus levodopa carbidopa supplementation.  They wanted to try Sinemet, and is taking the Sinemet 3 times a day, reports benefit, denies side effects, and does not want to make any changes at this point.     Compliance discussed at length.            FOLLOW-UP:   Return in about 6 months (around 12/3/2021).      EDUCATION AND COUNSELING:  -Education was provided to the patient and/or family regarding diagnosis and prognosis. The chronic and unpredictable nature of the condition were discussed. There is increased risk for additional events, which may carry potential for significant injuries and death. Discussed frequent seizure triggers: sleep deprivation, medication non-compliance, use of illegal drugs/alcohol, stress, and others.   -We reviewed in detail the current antiepileptic regimen. Potential side effects of antiepileptics were discussed at length, including but no limited to: hypersensitivity reactions (rash and others, some of which can be fatal), visual field changes (some of which may be irreversible), glaucoma, diplopia, kidney stones, osteopenia/osteoporosis/bone fractures, hyperthermia/anhydrosis,  hyponatremia, tremors/abnormal movements, ataxia, dizziness, fatigue, increased risk for falls, risk for cardiac arrhythmias and syncope, weight changes, hair loss, gastrointestinal side effects(hepatitis, pancreatitis, gastritis, ulcers, gingival hypertrophy/bleeding, drowsiness, sedation, memory loss, trouble finding words, anxiety/nervousness, increased risk for suicide, increased risk for depression, and psychosis.   -We also reviewed drug-drug interactions and their potential effect on seizure control and medication side effects.    -Recommend chronic vitamin D supplementation and regular exercise (if not contraindicated).   -Counseling was also provided on potential effects of alcohol and other drugs, which may lower seizure threshold and/or affect the metabolism of antiepileptic drugs. We recommend avoidance of alcohol and illegal drugs.  Denies use.  -Avoid sleep deprivation.   -We extensively discussed the aspects related to safety in drivers who suffer from epilepsy. The patient is encourage to report to the Division of Motor Vehicles of any condition and/or spells related to confusion, disorientation, and/or loss of awareness and/or loss of consciousness; as these may pose a safety issue if they occur while operating a motor vehicle. The patient and/or family are ultimately responsible for exercising caution and abiding to regulations in place. The patient understands that only the Department of Motor Vehicles (DMV) is able to authorize an individual to drive, even after medical clearance, DMV clearance must be obtained.  Does not drive.  -Other seizure precautions were discussed at length, including no diving, no skydiving, no climbing or exposure to unprotected heights, no unsupervised swimming, no Jacuzzi or bathing in bathtubs or deep bodies of water. The patient/family have been advised about risks for operating any machinery while suffering from seizures / syncope / epilepsy and/or while taking  antiepileptic drugs.   -The patient understands and agrees that due to the complexity of his/her diagnosis, results of any testing and further recommendations will typically be discussed/made during a face to face encounter in my office. The patient and/or family further understands it is their responsibility to keep proper follow up.     Patient agrees with plan, as outlined.         Miles Uriarte MD   Epilepsy and Neurodiagnostics.   Clinical  of Neurology [facility].   Diplomate in Neurology, Epilepsy, and Electrodiagnostic Medicine.   Office: [phone]  Fax: [phone]      BILLING DOCUMENTATION:     I have performed physical exam and reviewed and updated ROS and plan today 6/3/2021. In review of that note, there are no new changes except as documented above.     Counseling:  I spent greater than 50% time face-to-face time of a total of 30 minutes visit. Over 50% of the time of the visit today was spent on counseling and or coordination of care wtih the patient and/or family, with greater than 50% of the total discussing my assessment and plan as stated above.

## 2021-09-02 DIAGNOSIS — G40.109 LOCALIZATION-RELATED EPILEPSY (HCC): ICD-10-CM

## 2021-09-02 RX ORDER — LACOSAMIDE 150 MG/1
150 TABLET, FILM COATED ORAL 2 TIMES DAILY
Qty: 180 TABLET | Refills: 1 | Status: SHIPPED | OUTPATIENT
Start: 2021-09-02 | End: 2021-12-08 | Stop reason: SDUPTHER

## 2021-11-29 ENCOUNTER — TELEPHONE (OUTPATIENT)
Dept: NEUROLOGY | Facility: MEDICAL CENTER | Age: 51
End: 2021-11-29

## 2021-11-29 NOTE — TELEPHONE ENCOUNTER
LVM to let pt know that his Vitamin D was too high and to stop supplementation immediately as recommended by Dr. Uriarte. Requested return call if there were any questions.

## 2021-12-08 ENCOUNTER — HOSPITAL ENCOUNTER (OUTPATIENT)
Dept: LAB | Facility: MEDICAL CENTER | Age: 51
End: 2021-12-08
Attending: REGISTERED NURSE
Payer: MEDICARE

## 2021-12-08 ENCOUNTER — OFFICE VISIT (OUTPATIENT)
Dept: NEUROLOGY | Facility: MEDICAL CENTER | Age: 51
End: 2021-12-08
Attending: REGISTERED NURSE
Payer: MEDICARE

## 2021-12-08 VITALS
DIASTOLIC BLOOD PRESSURE: 80 MMHG | HEIGHT: 64 IN | TEMPERATURE: 98 F | OXYGEN SATURATION: 100 % | BODY MASS INDEX: 22.32 KG/M2 | SYSTOLIC BLOOD PRESSURE: 114 MMHG | HEART RATE: 60 BPM | WEIGHT: 130.73 LBS | RESPIRATION RATE: 12 BRPM

## 2021-12-08 DIAGNOSIS — G21.9 SECONDARY PARKINSONISM, UNSPECIFIED SECONDARY PARKINSONISM TYPE (HCC): ICD-10-CM

## 2021-12-08 DIAGNOSIS — G40.109 LOCALIZATION-RELATED EPILEPSY (HCC): ICD-10-CM

## 2021-12-08 DIAGNOSIS — R25.1 TREMOR: ICD-10-CM

## 2021-12-08 PROBLEM — R25.9 ABNORMAL INVOLUNTARY MOVEMENT: Status: ACTIVE | Noted: 2020-04-06

## 2021-12-08 LAB
ALBUMIN SERPL BCP-MCNC: 4.7 G/DL (ref 3.2–4.9)
ALBUMIN/GLOB SERPL: 1.5 G/DL
ALP SERPL-CCNC: 111 U/L (ref 30–99)
ALT SERPL-CCNC: 14 U/L (ref 2–50)
ANION GAP SERPL CALC-SCNC: 10 MMOL/L (ref 7–16)
AST SERPL-CCNC: 17 U/L (ref 12–45)
BASOPHILS # BLD AUTO: 0.3 % (ref 0–1.8)
BASOPHILS # BLD: 0.02 K/UL (ref 0–0.12)
BILIRUB SERPL-MCNC: 0.5 MG/DL (ref 0.1–1.5)
BUN SERPL-MCNC: 11 MG/DL (ref 8–22)
CALCIUM SERPL-MCNC: 10 MG/DL (ref 8.5–10.5)
CHLORIDE SERPL-SCNC: 100 MMOL/L (ref 96–112)
CO2 SERPL-SCNC: 27 MMOL/L (ref 20–33)
CREAT SERPL-MCNC: 0.64 MG/DL (ref 0.5–1.4)
EOSINOPHIL # BLD AUTO: 0.17 K/UL (ref 0–0.51)
EOSINOPHIL NFR BLD: 2.4 % (ref 0–6.9)
ERYTHROCYTE [DISTWIDTH] IN BLOOD BY AUTOMATED COUNT: 44.6 FL (ref 35.9–50)
GLOBULIN SER CALC-MCNC: 3.1 G/DL (ref 1.9–3.5)
GLUCOSE SERPL-MCNC: 87 MG/DL (ref 65–99)
HCT VFR BLD AUTO: 46.8 % (ref 42–52)
HGB BLD-MCNC: 15 G/DL (ref 14–18)
IMM GRANULOCYTES # BLD AUTO: 0.02 K/UL (ref 0–0.11)
IMM GRANULOCYTES NFR BLD AUTO: 0.3 % (ref 0–0.9)
LYMPHOCYTES # BLD AUTO: 1.76 K/UL (ref 1–4.8)
LYMPHOCYTES NFR BLD: 24.4 % (ref 22–41)
MCH RBC QN AUTO: 27.3 PG (ref 27–33)
MCHC RBC AUTO-ENTMCNC: 32.1 G/DL (ref 33.7–35.3)
MCV RBC AUTO: 85.2 FL (ref 81.4–97.8)
MONOCYTES # BLD AUTO: 0.4 K/UL (ref 0–0.85)
MONOCYTES NFR BLD AUTO: 5.6 % (ref 0–13.4)
NEUTROPHILS # BLD AUTO: 4.83 K/UL (ref 1.82–7.42)
NEUTROPHILS NFR BLD: 67 % (ref 44–72)
NRBC # BLD AUTO: 0 K/UL
NRBC BLD-RTO: 0 /100 WBC
PLATELET # BLD AUTO: 351 K/UL (ref 164–446)
PMV BLD AUTO: 8.4 FL (ref 9–12.9)
POTASSIUM SERPL-SCNC: 4.4 MMOL/L (ref 3.6–5.5)
PROT SERPL-MCNC: 7.8 G/DL (ref 6–8.2)
RBC # BLD AUTO: 5.49 M/UL (ref 4.7–6.1)
SODIUM SERPL-SCNC: 137 MMOL/L (ref 135–145)
WBC # BLD AUTO: 7.2 K/UL (ref 4.8–10.8)

## 2021-12-08 PROCEDURE — 36415 COLL VENOUS BLD VENIPUNCTURE: CPT

## 2021-12-08 PROCEDURE — 80053 COMPREHEN METABOLIC PANEL: CPT

## 2021-12-08 PROCEDURE — 85025 COMPLETE CBC W/AUTO DIFF WBC: CPT

## 2021-12-08 PROCEDURE — 99211 OFF/OP EST MAY X REQ PHY/QHP: CPT | Performed by: REGISTERED NURSE

## 2021-12-08 PROCEDURE — 99215 OFFICE O/P EST HI 40 MIN: CPT | Performed by: REGISTERED NURSE

## 2021-12-08 PROCEDURE — 82306 VITAMIN D 25 HYDROXY: CPT | Mod: GA

## 2021-12-08 RX ORDER — LACOSAMIDE 150 MG/1
150 TABLET, FILM COATED ORAL 2 TIMES DAILY
Qty: 180 TABLET | Refills: 1 | Status: SHIPPED | OUTPATIENT
Start: 2021-12-08 | End: 2022-02-28 | Stop reason: SDUPTHER

## 2021-12-08 RX ORDER — PROPRANOLOL HYDROCHLORIDE 20 MG/1
TABLET ORAL
COMMUNITY
Start: 2021-11-05 | End: 2022-09-21

## 2021-12-08 RX ORDER — ERGOCALCIFEROL 1.25 MG/1
CAPSULE ORAL
COMMUNITY
End: 2021-12-10

## 2021-12-08 NOTE — PROGRESS NOTES
Chief Complaint   Patient presents with   • Follow-Up     Secondary parkinsonism, unspecified secondary parkinsonism type (HCC)       Problem List Items Addressed This Visit     Tremor      Other Visit Diagnoses     Localization-related epilepsy (HCC)        Relevant Medications    VIMPAT 150 MG Tab    Other Relevant Orders    CBC WITH DIFFERENTIAL    Comp Metabolic Panel    VITAMIN D,25 HYDROXY    Secondary parkinsonism, unspecified secondary Parkinsonism type (HCC)              History of present illness:  Karri Hurtado 51 y.o. male presents today for f/u seizures.  He is here with a  Arin.      Reports no seizure activity.     No seizures since taking ant-iseizure medication.    No recent blood work results found.   Will f/u at [email or web address].      Current AED regimen:  vimpat 150 mg BID.      Prior brain imaging:   IMPRESSION:        1.  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     2.  Age-related cerebral atrophy.     3.  Minimal periventricular and juxtacortical white matter changes consistent with chronic microvascular ischemic gliosis.    Driving status: No     School/work status: Yes [employer] cerebral palsy      Past medical history:   Past Medical History:   Diagnosis Date   • Seizure (HCC)        Past surgical history:   History reviewed. No pertinent surgical history.    Family history:   History reviewed. No pertinent family history.    Social history:   Social History     Socioeconomic History   • Marital status: Single     Spouse name: Not on file   • Number of children: Not on file   • Years of education: Not on file   • Highest education level: Not on file   Occupational History   • Not on file   Tobacco Use   • Smoking status: Never Smoker   • Smokeless tobacco: Never Used   Vaping Use   • Vaping Use: Never used   Substance and Sexual Activity   • Alcohol use: No   • Drug use: No   • Sexual activity: Not on file   Other Topics Concern   • Not on file    Social History Narrative   • Not on file     Social Determinants of Health     Financial Resource Strain:    • Difficulty of Paying Living Expenses: Not on file   Food Insecurity:    • Worried About Running Out of Food in the Last Year: Not on file   • Ran Out of Food in the Last Year: Not on file   Transportation Needs:    • Lack of Transportation (Medical): Not on file   • Lack of Transportation (Non-Medical): Not on file   Physical Activity:    • Days of Exercise per Week: Not on file   • Minutes of Exercise per Session: Not on file   Stress:    • Feeling of Stress : Not on file   Social Connections:    • Frequency of Communication with Friends and Family: Not on file   • Frequency of Social Gatherings with Friends and Family: Not on file   • Attends Jainism Services: Not on file   • Active Member of Clubs or Organizations: Not on file   • Attends Club or Organization Meetings: Not on file   • Marital Status: Not on file   Intimate Partner Violence:    • Fear of Current or Ex-Partner: Not on file   • Emotionally Abused: Not on file   • Physically Abused: Not on file   • Sexually Abused: Not on file   Housing Stability:    • Unable to Pay for Housing in the Last Year: Not on file   • Number of Places Lived in the Last Year: Not on file   • Unstable Housing in the Last Year: Not on file       Current medications:   Current Outpatient Medications   Medication   • ergocalciferol (DRISDOL) 24229 UNIT capsule   • VIMPAT 150 MG Tab   • carbidopa-levodopa (SINEMET)  MG Tab   • propranolol (INDERAL) 20 MG Tab     No current facility-administered medications for this visit.       Medication Allergy:  No Known Allergies      Review of systems:   Constitutional: denies fever, night sweats, weight loss, or malaise/fatigue.   Eyes: denies acute vision change, eye pain or secretion.   Ears, Nose, Mouth, Throat: denies nasal secretion, nasal bleeding, difficulty swallowing, hearing loss, tinnitus, vertigo, ear pain,  "oral ulcers or lesions.   Endocrine: denies recent weight changes, heat or cold intolerance, polyuria, polydypsia, polyphagia,abnormal hair growth.  Cardiovascular: denies new onset of chest pain, palpitations, syncope, lower extremity edema, or dyspnea of exertion.  Pulmonary: denies shortness of breath, new onset of cough, hemoptysis, wheezing, chest pain or flu-like symptoms.   GI: denies nausea, vomiting, diarrhea, GI bleeding, change in appetite, abdominal pain, and change in bowel habits.  : denies urinary incontinence, retention or hematuria.  Heme/oncology: denies history of easy bruising or bleeding. No history of cancer. Allergy/immunology: denies hives/urticarial.  Dermatologic: denies new rash.  Musculoskeletal:denies joint swelling or pain, muscle pain, neck and back pain. Neurologic: denies headaches, acute visual changes, facial droopiness, muscle weakness (focal or generalized), paresthesias, anesthesia, ataxia, change in speech or language, memory loss, abnormal movements, seizures, loss of consciousness, or episodes of confusion.   Psychiatric: denies symptoms of depression, anxiety, hallucinations, mood swings or changes, suicidal or homicidal thoughts.     Physical examination:   Vitals:    12/08/21 1042   BP: 114/80   BP Location: Right arm   Patient Position: Sitting   BP Cuff Size: Adult   Pulse: 60   Resp: 12   Temp: 36.7 °C (98 °F)   TempSrc: Temporal   SpO2: 100%   Weight: 59.3 kg (130 lb 11.7 oz)   Height: 1.626 m (5' 4\")     General: Patient in no acute distress, pleasant and cooperative.  HEENT: Normocephalic, no signs of acute trauma.   Neck: supple, no meningeal signs or carotid bruits. There is normal range of motion. No tenderness on exam.   Chest: clear to auscultation. No cough.   CV: RRR, no murmurs.   Abdomen: soft, non distended or tender.   Skin: no signs of acute rashes or trauma.   Musculoskeletal: joints exhibit full range of motion, without any pain to palpation. There " are no signs of joint or muscle swelling. There is no tenderness to deep palpation of muscles.   Psychiatric: No hallucinatory behavior. Denies symptoms of depression or suicidal ideation. Mood and affect appear normal on exam.     NEUROLOGICAL EXAM:   Mental status, orientation: Awake, alert and fully oriented.   Speech and language: speech is clear and fluent. The patient is able to name, repeat and comprehend.   Memory: There is intact recollection of recent and remote events.   Cranial nerve exam: Pupils are 3-4 mm bilaterally and equally reactive to light and accommodation. Visual fields are intact by confrontation. There is no nystagmus on primary or secondary gaze. Intact full EOM in all directions of gaze. Face appears symmetric. Sensation in the face is intact to light touch. Uvula is midline. Palate elevates symmetrically. Tongue is midline and without any signs of tongue biting or fasciculations.Sternocleidomastoid muscles exhibit is normal strength bilaterally. Shoulder shrug is intact bilaterally.   Motor exam: Strength is 5/5 in all extremities. Tone is normal. No abnormal movements were seen on exam.   Sensory exam reveals normal sense of light touch, proprioception, vibration and pinprick in all extremities.   Deep tendon reflexes:  2+ throughout. Plantar responses are flexor. There is no clonus.   Coordination: shows a normal finger-nose-finger. Normal rapidly alternating movements.   Gait: The patient was able to get up from seated position on first attempt without requiring assistance. Found to be steady when walking. Movements were fluid with normal arm swing. The patient was able to turn without difficulties or tendency to fall. Romberg exam negative.        ANCILLARY DATA REVIEWED:       Lab Data Review:        Records reviewed:           EE2020  INTERPRETATION:   This is an abnormal 24 hours ambulatory electroencephalogram recording in the awake, drowsy and sleep state. There is  slowing in the right frontal temporal region, suggestive of an underlying structural abnormality.  Rare right frontotemporal sharps noted, which increase the risk for seizures, and suggest underlying cortical irritability.  No events or seizures were captured during the study. Clinical and radiological correlation is recommended.    ASSESSMENT AND PLAN:    Karri Tyler is a 51 year old male with a history of seizures, left hand tremor and parkinsons.  He states he has been seizure free ever since starting anti seizure medications.  He is here with a  today.  I was unable to find current blood work and encouraged them to have labs drawn.   I have asked our MA to locate labs at [facility] in the interim.  I did not get any results today.    Left hand with tremor      1. Localization-related epilepsy (HCC)  - CBC WITH DIFFERENTIAL; Future  - Comp Metabolic Panel; Future  - VITAMIN D,25 HYDROXY; Future  - VIMPAT 150 MG Tab; Take 150 mg by mouth 2 times a day for 180 days.  Dispense: 180 Tablet; Refill: 1    2. Secondary parkinsonism, unspecified secondary Parkinsonism type (HCC)  On sinemet    3. Tremor  Left hand with constant tremor noted.    Other orders  - ergocalciferol (DRISDOL) 42889 UNIT capsule; ergocalciferol (vitamin D2) 1,250 mcg (50,000 unit) capsule  - propranolol (INDERAL) 20 MG Tab       FOLLOW-UP:     Six months and prn.    EDUCATION AND COUNSELING:  -Education was provided to the patient and/or family regarding diagnosis and prognosis. The chronic and unpredictable nature of the condition were discussed. There is increased risk for additional events, which may carry potential for significant injuries and death. Discussed frequent seizure triggers: sleep deprivation, medication non-compliance, use of illegal drugs/alcohol, stress, and others.   -We reviewed in detail the current antiepileptic regimen. Potential side effects of antiepileptics were discussed at  length, including but no limited to: hypersensitivity reactions (rash and others, some of which can be fatal), visual field changes (some of which may be irreversible), glaucoma, diplopia, kidney stones, osteopenia/osteoporosis/bone fractures, hyperthermia/anhydrosis, hyponatremia, tremors/abnormal movements, ataxia, dizziness, fatigue, increased risk for falls, risk for cardiac arrhythmias and syncope, weight changes, hair loss, gastrointestinal side effects(hepatitis, pancreatitis, gastritis, ulcers, gingival hypertrophy/bleeding, drowsiness, sedation, memory loss, trouble finding words, anxiety/nervousness, increased risk for suicide, increased risk for depression, and psychosis.   -We also reviewed drug-drug interactions and their potential effect on seizure control and medication side effects.    -We also discussed in detail potential effects of seizures, epilepsy, and medications during pregnancy, including but not limited to fetal malformations, child developmental/intellectual disability, fetal/ risk for hemorrhages, stillbirth, maternal death, premature birth, and others. The patient/family aware that pregnancy should be avoided, unless desired, in which case we recommend discussing with us at least a year prior to planned conception. To avoid undesired pregnancy while on antiepileptics, we recommend dual contraception.   -Folic acid 2 mg is recommended for all females in childbearing age (12-44 years of age).   -Recommend chronic vitamin D supplementation and regular exercise (if not contraindicated).   -Patient/family educated on risk for SUDEP (Sudden Death in Epilepsy). Counseling was provided on the importance of strict medication and follow up compliance. The patient/family understand the risks associated with non-adherence with the medical plan as outlined, including but not limited to an increased risk for breakthrough seizures, which may contribute to injuries, disability, status  epilepticus, and even death.   -Counseling was also provided on potential effects of alcohol and other drugs, which may lower seizure threshold and/or affect the metabolism of antiepileptic drugs. We recommend avoidance of alcohol and illegal drugs.  -Avoid sleep deprivation.   -We extensively discussed the aspects related to safety in drivers who suffer from epilepsy. The patient is encourage to report to the Division of Motor Vehicles of any condition and/or spells related to confusion, disorientation, and/or loss of awareness and/or loss of consciousness; as these may pose a safety issue if they occur while operating a motor vehicle. The patient and/or family are ultimately responsible for exercising caution and abiding to regulations in place. The patient understands that only the Department of Motor Vehicles (DMV) is able to authorize an individual to drive, even after medical clearance, DMV clearance must be obtained.   -Other seizure precautions were discussed at length, including no diving, no skydiving, no climbing or exposure to unprotected heights, no unsupervised swimming, no Jacuzzi or bathing in bathtubs or deep bodies of water. The patient/family have been advised about risks for operating any machinery while suffering from seizures / syncope / epilepsy and/or while taking antiepileptic drugs.   -The patient understands and agrees that due to the complexity of his/her diagnosis, results of any testing and further recommendations will typically be discussed/made during a face to face encounter in my office. The patient and/or family further understands it is their responsibility to keep proper follow up.     Patient/family agree with plan, as outlined.     My total time spent caring for the patient on the day of the encounter was 54 minutes.   This does not include time spent on separately billable procedures/tests.      Urszula MCGHEE, FNP-C, MSCNP

## 2021-12-12 LAB — 25(OH)D3 SERPL-MCNC: 56 NG/ML (ref 30–80)

## 2021-12-13 ENCOUNTER — TELEPHONE (OUTPATIENT)
Dept: NEUROLOGY | Facility: MEDICAL CENTER | Age: 51
End: 2021-12-13

## 2021-12-13 NOTE — TELEPHONE ENCOUNTER
Called and LVM for pt to let him know, per , that we received his labs and his vit D levels were high and he should stop taking all supplements, including weekly prescription. Left callback number.

## 2022-02-28 DIAGNOSIS — G40.109 LOCALIZATION-RELATED EPILEPSY (HCC): ICD-10-CM

## 2022-02-28 RX ORDER — LACOSAMIDE 150 MG/1
150 TABLET, FILM COATED ORAL 2 TIMES DAILY
Qty: 180 TABLET | Refills: 1 | Status: SHIPPED | OUTPATIENT
Start: 2022-02-28 | End: 2023-09-05 | Stop reason: SDUPTHER

## 2022-03-08 ENCOUNTER — TELEPHONE (OUTPATIENT)
Dept: NEUROLOGY | Facility: MEDICAL CENTER | Age: 52
End: 2022-03-08
Payer: MEDICARE

## 2022-03-09 NOTE — TELEPHONE ENCOUNTER
Called Karri/Jacinda back, no answer. LVM letting them know that the vimpat had been approved, and was sent to pharmacy. Left callback incase they had anymore issues.

## 2022-06-08 ENCOUNTER — APPOINTMENT (OUTPATIENT)
Dept: NEUROLOGY | Facility: MEDICAL CENTER | Age: 52
End: 2022-06-08
Attending: PSYCHIATRY & NEUROLOGY
Payer: MEDICARE

## 2022-09-16 ENCOUNTER — TELEPHONE (OUTPATIENT)
Dept: NEUROLOGY | Facility: MEDICAL CENTER | Age: 52
End: 2022-09-16
Payer: COMMERCIAL

## 2022-09-16 NOTE — TELEPHONE ENCOUNTER
Picked up 2 boxes samples vimpat 150 bid. Patient has appointment on 09/21/2022 I told caregiver if he doesn't show we will not be able to give any more sample. She verbalized understanding.

## 2022-09-20 NOTE — PROGRESS NOTES
Chief Complaint   Patient presents with    Follow-Up    Seizure       Problem List Items Addressed This Visit       Tremor    Relevant Orders    Referral to Neurology     Other Visit Diagnoses       Localization-related epilepsy (HCC)        Relevant Medications    Lacosamide 150 MG Tab    Other Relevant Orders    CBC WITH DIFFERENTIAL    Comp Metabolic Panel    LACOSAMIDE    Vitamin D deficiency        Relevant Orders    VITAMIN D,25 HYDROXY (DEFICIENCY)    Secondary parkinsonism, unspecified secondary Parkinsonism type (HCC)        Relevant Orders    Referral to Neurology            History of present illness:  Karri Tyler 51 y.o. male presents today with Mary, support staff from [facility], to establish care with Me. Last seen by Urszula Cho in Dec 2021.    He reports that he started having seizures since he was a baby. There was a history of TBI and CP. Pt has intellectual disability and abnormal gait but no recent falls. He is going to PT for balance training. They cannot describe the seizures. All they know is convulsion. Can't recall if there was tongue biting or incontinence. No staring off spells. He was dilantin before then it was switched to vimpat. He's been controlled since. However, he had a breakthrough seizure on 9/15/22 as he ran out of vimpat for ~2 days. Mood is good. No SI or HI. He stopped taking vit D as he was told to stop. He is not driving. No alcohol, cigarettes or recreational drug use. He also has chronic jesse hand shaking (L>R) and states he is taking meds for this but cannot tell me the name. No other issues.        Past medical history:   Past Medical History:   Diagnosis Date    Seizure (HCC)        Past surgical history:   No past surgical history on file.    Family history:   No family history on file.    Social history:   Social History     Socioeconomic History    Marital status: Single     Spouse name: Not on file    Number of children: Not on file    Years  of education: Not on file    Highest education level: Not on file   Occupational History    Not on file   Tobacco Use    Smoking status: Never    Smokeless tobacco: Never   Vaping Use    Vaping Use: Never used   Substance and Sexual Activity    Alcohol use: No    Drug use: No    Sexual activity: Not on file   Other Topics Concern    Not on file   Social History Narrative    Not on file     Social Determinants of Health     Financial Resource Strain: Not on file   Food Insecurity: Not on file   Transportation Needs: Not on file   Physical Activity: Not on file   Stress: Not on file   Social Connections: Not on file   Intimate Partner Violence: Not on file   Housing Stability: Not on file       Current medications:   Current Outpatient Medications   Medication    propranolol (INDERAL) 20 MG Tab     No current facility-administered medications for this visit.       Medication Allergy:  No Known Allergies      Review of systems:     General: Denies fevers or chills, or nightsweats, or generalized fatigue.    Head: Denies headaches or dizziness or lightheadedness  EENT: Denies vision changes, vision loss or pain, nasal secretion, nasal bleeding, difficulty swallowing, hearing loss, tinnitus, vertigo, ear pain  Respiratory: Denies shortness of breath, cough, sputum, or wheezing  Cardiac: Denies chest pain, palpitations, edema or syncope  Gastrointestinal: Denies nausea, vomiting, no abdominal pain or change in bowel habits, no melena or hematochezia  Urinary: Denies dysuria, frequency, hesitancy, or incontinence.  Dermatologic:  Denies new rash  Musculoskeletal: Denies muscle pain or swelling, no atrophy, no neck and back pain or stiffness.   Neurologic: Denies facial droopiness, muscle weakness (focal or generalized), paresthesias, ataxia, change in speech or language, memory loss. +seizures, tremors jesse hands  Psychiatric: Denies anxiety, depression, mood swings, suicidal or homicidal thoughts       Physical  "examination:   Vitals:    09/21/22 1404   BP: 110/64   BP Location: Right arm   Patient Position: Sitting   BP Cuff Size: Adult   Pulse: 80   Resp: 12   Temp: 36.4 °C (97.6 °F)   TempSrc: Temporal   SpO2: 98%   Weight: 59.9 kg (132 lb)   Height: 1.626 m (5' 4\")     General: Patient in no acute distress, pleasant and cooperative.  HEENT: Normocephalic, no signs of acute trauma.   moist conjunctivae. Nares are patent. Oropharynx clear without lesions and normal  hard and soft palates.   Neck: Supple. There is normal range of motion.   Resp: clear to auscultation bilaterally. No wheezes or crackles.   CV: RRR, no murmurs.   Skin: no signs of acute rashes or trauma.   Musculoskeletal: joints exhibit full range of motion  Psychiatric: No hallucinatory behavior. No symptoms of depression or suicidal ideation. Mood and affect appear normal on exam.     NEUROLOGICAL EXAM:   Mental status, orientation: Awake, alert and fully oriented.   Speech and language: speech is clear and fluent. The patient is able to name, repeat and comprehend.   Memory: There is intact recollection of recent and remote events.   Cranial nerve exam:   CN I: Not examined   CN II: PERRL.   CN III, IV, VI: EOMI; no nystagmus   CN V: Facial sensation intact bilaterally   CN VII: face symmetric   CN VIII: hearing intact to finger rub bilaterally   CN IX, X: palate elevates symmetrically   CN XI: Symmetric shoulder shrug  CN XII: tongue midline.   Motor exam: Strength is 5/5 in all extremities. Tone is normal. Tremors jesse hand (L>R) with pill rolling in L.   Sensory exam reveals normal sense of light touch in all extremities.   Deep tendon reflexes:  mildly brisk throughout.   Coordination: mild dysmetria  Gait: abnormal gait with some degree of limping which is chronic       ANCILLARY DATA REVIEWED:       Lab Data Review:  Reviewed in chart.       Records reviewed:   Reviewed in chart.    Imaging:   MRI brain without, date of service 11/28/2018:  1.  " Encephalomalacia of the right middle temporal gyrus.  2.  Age-related cerebral atrophy.  3.  Minimal periventricular and juxtacortical white matter changes consistent with chronic microvascular ischemic gliosis.          EEG:  Video EG, Dr. Day, date of service 3/20/2019:  INTERPRETATION:  This is a normal video EEG recording in the awake and briefly   drowsy state(s).  Clinical correlation is recommended.  An   isolated sharp discharges seen over the right anterior temporal   region, of unclear clinical significance, though if clinically   appropriate based on the patient's history, and ambulatory   tracing might be appropriate.     Ambulatory EEG, 1/6/2020:  This is an abnormal 24 hours ambulatory electroencephalogram   recording in the awake, drowsy and sleep state. There is slowing   in the right frontal temporal region, suggestive of an underlying   structural abnormality.  Rare right frontotemporal sharps noted,   which increase the risk for seizures, and suggest underlying   cortical irritability.  No events or seizures were captured   during the study. Clinical and radiological correlation is   recommended.            ASSESSMENT AND PLAN:    1. Localization-related epilepsy (HCC)  - CBC WITH DIFFERENTIAL; Future  - Comp Metabolic Panel; Future  - LACOSAMIDE; Future  - Lacosamide 150 MG Tab; Take 150 mg by mouth 2 times a day for 180 days.  Dispense: 60 Tablet; Refill: 5    2. Vitamin D deficiency  - VITAMIN D,25 HYDROXY (DEFICIENCY); Future    3. Secondary parkinsonism, unspecified secondary Parkinsonism type (HCC)  - Referral to Neurology    4. 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 According to Dr. Uriarte's note, pt fell off a hammock when he was an infant. Pt was unable to provide a good history today but it seems like his seizures are well controlled on vimpat. He had a breakthrough seizure on 9/15/22 as he ran out of  vimpat. Been compliant since. He refused to update work-up as he is stable.     Has chronic tremors/parkinsonism and Dr. Uriarte switched him from propranolol to sinemet which has been beneficial to him. However, his med list showed propranolol still and no sinemet. Not sure which one he is exactly taking currently. Recommended to bring meds next time. He may benefit from further eval with Dr. Malloy here in clinic. Ref given.    Mood is good. No suicidal or homicidal thoughts. requested.     Past ASM's: dilantin    Current ASM's: vimpat 150mg BID      Plan:  - continue ASM    - Discussed avoidance of spell/sz triggers: alcohol, sleep deprivation, and stress.    - Discussed Vit D supplementation. Recommended taking 2000-5000u daily.    - He is not driving.     -Labs to be checked for next appointment: CBC, CMP, Vit D, lacosamide    -Aware that I will no longer be with [facility] after Sept 2022          FOLLOW-UP:   Return in about 6 months (around 3/21/2023), or if symptoms worsen or fail to improve.      EDUCATION AND COUNSELING:  -Education was provided to the patient and/or family regarding diagnosis and prognosis. The chronic and unpredictable nature of the condition were discussed. There is increased risk for additional events, which may carry potential for significant injuries and death. Discussed frequent seizure triggers: sleep deprivation, medication non-compliance, use of illegal drugs/alcohol, stress, and others.   - There are potential side effects of antiepileptics including but no limited to: hypersensitivity reactions (rash and others, some of which can be fatal), visual field changes (some of which may be irreversible), glaucoma, diplopia, kidney stones, osteopenia/osteoporosis/bone fractures, hyperthermia/anhydrosis, hyponatremia, tremors/abnormal movements, ataxia, dizziness, fatigue, increased risk for falls, risk for cardiac arrhythmias/syncope, gastrointestinal side effects(hepatitis, pancreatitis,  gastritis, ulcers), gingival hypertrophy/bleeding, drowsiness, sedation, anxiety/nervousness, increased risk for suicide, increased risk for depression, and psychosis.   -Recommend chronic vitamin D supplementation and regular exercise (if not contraindicated).   -Patient/family educated on risk for SUDEP (Sudden Death in Epilepsy). Counseling was provided on the importance of strict medication and follow up compliance. The patient/family understand the risks associated with non-adherence with the medical plan as outlined, including but not limited to an increased risk for breakthrough seizures, which may contribute to injuries, disability, status epilepticus, and even death.   -There are potential effects of alcohol and other drugs, which may lower seizure threshold and/or affect the metabolism of antiepileptic drugs. We recommend avoidance of alcohol and illegal drugs.  -Avoid sleep deprivation.   -The patient is encourage to report to the Division of Motor Vehicles of any condition and/or spells related to confusion, disorientation, and/or loss of awareness and/or loss of consciousness; as these may pose a safety issue if they occur while operating a motor vehicle. The patient and/or family are ultimately responsible for exercising caution and abiding to regulations in place.   -Other seizure precautions were discussed at length, including no diving, no skydiving, no climbing or exposure to unprotected heights, no unsupervised swimming, no Jacuzzi or bathing in bathtubs or deep bodies of water. There are risks for operating any machinery while suffering from seizures / syncope / epilepsy and/or while taking antiepileptic drugs.   -The patient understands and agrees that due to the complexity of his/her diagnosis, results of any testing and further recommendations will typically be discussed/made during a face to face encounter in my office. The patient and/or family further understands it is their responsibility  to keep proper follow up.     Patient/Mary agree with plan, as outlined.         Ariana Friedman, MSN, APRN, FNP-C  [facility]  Office: [phone]  Fax: [phone]

## 2022-09-21 ENCOUNTER — TELEPHONE (OUTPATIENT)
Dept: NEUROLOGY | Facility: MEDICAL CENTER | Age: 52
End: 2022-09-21

## 2022-09-21 ENCOUNTER — OFFICE VISIT (OUTPATIENT)
Dept: NEUROLOGY | Facility: MEDICAL CENTER | Age: 52
End: 2022-09-21
Attending: NURSE PRACTITIONER
Payer: COMMERCIAL

## 2022-09-21 VITALS
TEMPERATURE: 97.6 F | SYSTOLIC BLOOD PRESSURE: 110 MMHG | WEIGHT: 132 LBS | OXYGEN SATURATION: 98 % | BODY MASS INDEX: 22.53 KG/M2 | HEART RATE: 80 BPM | HEIGHT: 64 IN | RESPIRATION RATE: 12 BRPM | DIASTOLIC BLOOD PRESSURE: 64 MMHG

## 2022-09-21 DIAGNOSIS — R25.1 TREMOR: ICD-10-CM

## 2022-09-21 DIAGNOSIS — G40.109 LOCALIZATION-RELATED EPILEPSY (HCC): ICD-10-CM

## 2022-09-21 DIAGNOSIS — E55.9 VITAMIN D DEFICIENCY: ICD-10-CM

## 2022-09-21 DIAGNOSIS — G21.9 SECONDARY PARKINSONISM, UNSPECIFIED SECONDARY PARKINSONISM TYPE (HCC): ICD-10-CM

## 2022-09-21 PROCEDURE — 99211 OFF/OP EST MAY X REQ PHY/QHP: CPT | Performed by: NURSE PRACTITIONER

## 2022-09-21 PROCEDURE — 99214 OFFICE O/P EST MOD 30 MIN: CPT | Performed by: NURSE PRACTITIONER

## 2022-09-21 RX ORDER — BACLOFEN 10 MG/1
1 TABLET ORAL DAILY
COMMUNITY
End: 2022-09-21

## 2022-09-21 RX ORDER — LACOSAMIDE 150 MG/1
150 TABLET ORAL 2 TIMES DAILY
Qty: 60 TABLET | Refills: 5 | Status: SHIPPED | OUTPATIENT
Start: 2022-09-21 | End: 2023-01-03 | Stop reason: SDUPTHER

## 2022-09-21 RX ORDER — LACOSAMIDE 150 MG/1
150 TABLET ORAL 2 TIMES DAILY
COMMUNITY
End: 2022-09-21 | Stop reason: SDUPTHER

## 2022-09-21 ASSESSMENT — FIBROSIS 4 INDEX: FIB4 SCORE: 0.63

## 2022-09-21 ASSESSMENT — PATIENT HEALTH QUESTIONNAIRE - PHQ9: CLINICAL INTERPRETATION OF PHQ2 SCORE: 0

## 2022-09-21 NOTE — TELEPHONE ENCOUNTER
Lacosamide (Vimpat) 150mg Tablet    Request rec'd via [facility], TERE Huerta paid copay $0.00 #60/30 DS notifying liaison Doretha Coats or outreach.

## 2022-10-08 ENCOUNTER — HOSPITAL ENCOUNTER (OUTPATIENT)
Dept: LAB | Facility: MEDICAL CENTER | Age: 52
End: 2022-10-08
Attending: NURSE PRACTITIONER
Payer: COMMERCIAL

## 2022-10-08 DIAGNOSIS — E55.9 VITAMIN D DEFICIENCY: ICD-10-CM

## 2022-10-08 DIAGNOSIS — G40.109 LOCALIZATION-RELATED EPILEPSY (HCC): ICD-10-CM

## 2022-10-08 LAB
25(OH)D3 SERPL-MCNC: 25 NG/ML (ref 30–100)
ALBUMIN SERPL BCP-MCNC: 4.7 G/DL (ref 3.2–4.9)
ALBUMIN/GLOB SERPL: 1.4 G/DL
ALP SERPL-CCNC: 125 U/L (ref 30–99)
ALT SERPL-CCNC: 18 U/L (ref 2–50)
ANION GAP SERPL CALC-SCNC: 10 MMOL/L (ref 7–16)
AST SERPL-CCNC: 20 U/L (ref 12–45)
BASOPHILS # BLD AUTO: 0.6 % (ref 0–1.8)
BASOPHILS # BLD: 0.03 K/UL (ref 0–0.12)
BILIRUB SERPL-MCNC: 0.4 MG/DL (ref 0.1–1.5)
BUN SERPL-MCNC: 17 MG/DL (ref 8–22)
CALCIUM SERPL-MCNC: 9.6 MG/DL (ref 8.5–10.5)
CHLORIDE SERPL-SCNC: 102 MMOL/L (ref 96–112)
CO2 SERPL-SCNC: 28 MMOL/L (ref 20–33)
CREAT SERPL-MCNC: 0.65 MG/DL (ref 0.5–1.4)
EOSINOPHIL # BLD AUTO: 0.12 K/UL (ref 0–0.51)
EOSINOPHIL NFR BLD: 2.5 % (ref 0–6.9)
ERYTHROCYTE [DISTWIDTH] IN BLOOD BY AUTOMATED COUNT: 42.3 FL (ref 35.9–50)
GFR SERPLBLD CREATININE-BSD FMLA CKD-EPI: 115 ML/MIN/1.73 M 2
GLOBULIN SER CALC-MCNC: 3.4 G/DL (ref 1.9–3.5)
GLUCOSE SERPL-MCNC: 97 MG/DL (ref 65–99)
HCT VFR BLD AUTO: 47.9 % (ref 42–52)
HGB BLD-MCNC: 15.1 G/DL (ref 14–18)
IMM GRANULOCYTES # BLD AUTO: 0.01 K/UL (ref 0–0.11)
IMM GRANULOCYTES NFR BLD AUTO: 0.2 % (ref 0–0.9)
LYMPHOCYTES # BLD AUTO: 1.63 K/UL (ref 1–4.8)
LYMPHOCYTES NFR BLD: 33.5 % (ref 22–41)
MCH RBC QN AUTO: 27 PG (ref 27–33)
MCHC RBC AUTO-ENTMCNC: 31.5 G/DL (ref 33.7–35.3)
MCV RBC AUTO: 85.7 FL (ref 81.4–97.8)
MONOCYTES # BLD AUTO: 0.35 K/UL (ref 0–0.85)
MONOCYTES NFR BLD AUTO: 7.2 % (ref 0–13.4)
NEUTROPHILS # BLD AUTO: 2.72 K/UL (ref 1.82–7.42)
NEUTROPHILS NFR BLD: 56 % (ref 44–72)
NRBC # BLD AUTO: 0 K/UL
NRBC BLD-RTO: 0 /100 WBC
PLATELET # BLD AUTO: 360 K/UL (ref 164–446)
PMV BLD AUTO: 8.2 FL (ref 9–12.9)
POTASSIUM SERPL-SCNC: 4.8 MMOL/L (ref 3.6–5.5)
PROT SERPL-MCNC: 8.1 G/DL (ref 6–8.2)
RBC # BLD AUTO: 5.59 M/UL (ref 4.7–6.1)
SODIUM SERPL-SCNC: 140 MMOL/L (ref 135–145)
WBC # BLD AUTO: 4.9 K/UL (ref 4.8–10.8)

## 2022-10-08 PROCEDURE — 85025 COMPLETE CBC W/AUTO DIFF WBC: CPT

## 2022-10-08 PROCEDURE — 80053 COMPREHEN METABOLIC PANEL: CPT

## 2022-10-08 PROCEDURE — 36415 COLL VENOUS BLD VENIPUNCTURE: CPT

## 2022-10-08 PROCEDURE — 80235 DRUG ASSAY LACOSAMIDE: CPT

## 2022-10-08 PROCEDURE — 82306 VITAMIN D 25 HYDROXY: CPT

## 2022-10-13 LAB — LACOSAMIDE SERPL-MCNC: 8.3 UG/ML (ref 1–10)

## 2022-11-18 ENCOUNTER — OFFICE VISIT (OUTPATIENT)
Dept: NEUROLOGY | Facility: MEDICAL CENTER | Age: 52
End: 2022-11-18
Attending: PSYCHIATRY & NEUROLOGY
Payer: COMMERCIAL

## 2022-11-18 VITALS
HEART RATE: 65 BPM | BODY MASS INDEX: 22.48 KG/M2 | OXYGEN SATURATION: 98 % | DIASTOLIC BLOOD PRESSURE: 72 MMHG | WEIGHT: 130.95 LBS | SYSTOLIC BLOOD PRESSURE: 126 MMHG | TEMPERATURE: 98.7 F

## 2022-11-18 DIAGNOSIS — G20.A1 PARKINSON DISEASE (HCC): Primary | ICD-10-CM

## 2022-11-18 PROBLEM — R25.1 TREMOR: Status: RESOLVED | Noted: 2018-11-12 | Resolved: 2022-11-18

## 2022-11-18 PROCEDURE — 99211 OFF/OP EST MAY X REQ PHY/QHP: CPT | Performed by: PSYCHIATRY & NEUROLOGY

## 2022-11-18 PROCEDURE — 99214 OFFICE O/P EST MOD 30 MIN: CPT | Performed by: PSYCHIATRY & NEUROLOGY

## 2022-11-18 RX ORDER — PROPRANOLOL HYDROCHLORIDE 20 MG/1
20 TABLET ORAL 3 TIMES DAILY
COMMUNITY
End: 2022-11-18

## 2022-11-18 ASSESSMENT — FIBROSIS 4 INDEX: FIB4 SCORE: 0.65

## 2022-11-18 NOTE — PROGRESS NOTES
Chief Complaint   Patient presents with    New Patient     Movement disorder        History of present illness:  Karri Tyler 52 y.o. male with history of structural epilepsy, chronic tremors/parkinsonism that Dr. Uriarte felt was post-traumatic due to his childhood severe TBI. He has had 5 years of tremors.   Propranolol is the treatment that is being prescribed for his tremor. He feels that it is helpful for his shaking.   He was on sinemet in the past but it was discontinued about 3-4 months ago.   He has had an abnormal gait his entire life.     Past medical history:   Past Medical History:   Diagnosis Date    Seizure (HCC)        Past surgical history:   No past surgical history on file.    Family history:   No family history on file.    Social history:   Social History     Socioeconomic History    Marital status: Single     Spouse name: Not on file    Number of children: Not on file    Years of education: Not on file    Highest education level: Not on file   Occupational History    Not on file   Tobacco Use    Smoking status: Never    Smokeless tobacco: Never   Vaping Use    Vaping Use: Never used   Substance and Sexual Activity    Alcohol use: No    Drug use: No    Sexual activity: Not on file   Other Topics Concern    Not on file   Social History Narrative    Not on file     Social Determinants of Health     Financial Resource Strain: Not on file   Food Insecurity: Not on file   Transportation Needs: Not on file   Physical Activity: Not on file   Stress: Not on file   Social Connections: Not on file   Intimate Partner Violence: Not on file   Housing Stability: Not on file       Current medications:   Current Outpatient Medications   Medication    carbidopa-levodopa (SINEMET)  MG Tab    Lacosamide 150 MG Tab     No current facility-administered medications for this visit.       Medication Allergy:  No Known Allergies    Physical examination:   Vitals:    11/18/22 0822   BP: 126/72   BP  Location: Left arm   Patient Position: Sitting   BP Cuff Size: Adult   Pulse: 65   Temp: 37.1 °C (98.7 °F)   TempSrc: Temporal   SpO2: 98%   Weight: 59.4 kg (130 lb 15.3 oz)     Neurological Exam  Mental Status    Markedly diminished facial expression - hypophonia   Soft hypophonic speech .    Cranial Nerves  CN III, IV, VI: Abnormal extraocular movements: Left eye does not adduct .    Motor   The following abnormal movements were seen: Bilateral upper extremity rest tremor, with the left side worse than right..    He is severe bradykinesia..    Gait    There is markedly decreased arm swing and stooped posture.  His legs are bent and he has a marked gait disorder where he is unable to straighten his knees..    Labs:  I reviewed the following labs personally:  None    Imaging:   MRI brain without, date of service 11/28/2018:  1.  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  2.  Age-related cerebral atrophy.  3.  Minimal periventricular and juxtacortical white matter changes consistent with chronic microvascular ischemic gliosis    Ambulatory EEG, 1/6/2020:  This is an abnormal 24 hours ambulatory electroencephalogram   recording in the awake, drowsy and sleep state. There is slowing   in the right frontal temporal region, suggestive of an underlying   structural abnormality.  Rare right frontotemporal sharps noted,   which increase the risk for seizures, and suggest underlying   cortical irritability.  No events or seizures were captured   during the study. Clinical and radiological correlation is   recommended.     ASSESSMENT AND PLAN:  Problem List Items Addressed This Visit       Parkinson disease (HCC) - Primary    Relevant Medications    carbidopa-levodopa (SINEMET)  MG Tab       1. Parkinson disease (HCC)  - carbidopa-levodopa (SINEMET)  MG Tab; Take 1 Tablet by mouth 3 times a day.  Dispense: 270 Tablet; Refill: 2    52-year-old male with history of epilepsy, referred to me for tremors.   Dr. Uriarte previously had him on Sinemet, therefore he suspected this patient had Parkinson's disease.  Recently, his Sinemet was discontinued by his primary doctor and he is severely bradykinetic.  I have resumed the Sinemet, he will return to see me after 3 months.  I have counseled the patient that this looks like Parkinson's disease.    FOLLOW-UP:   Return in about 3 months (around 2/18/2023).    Total time spent for the day for this patient unrelated to procedure time is: 20 minutes. I spent 16 minutes in face to face time and I spent 1 minutes pre-charting and 3 minutes in post-visit documentation.      Dr. Will Malloy D.O.  [facility]

## 2022-11-18 NOTE — PATIENT INSTRUCTIONS
Stop propranolol     Start carbidopa/levodopa at 1 tab 3 times daily     If no improvement in the tremor after starting carbidopa levodopa, you may increase to 1-1/2 tablets 3 times daily after a full 2 weeks.

## 2023-01-03 DIAGNOSIS — G40.109 LOCALIZATION-RELATED EPILEPSY (HCC): ICD-10-CM

## 2023-01-03 RX ORDER — LACOSAMIDE 150 MG/1
150 TABLET ORAL 2 TIMES DAILY
Qty: 60 TABLET | Refills: 5 | Status: SHIPPED | OUTPATIENT
Start: 2023-01-03 | End: 2023-04-04 | Stop reason: SDUPTHER

## 2023-01-03 NOTE — TELEPHONE ENCOUNTER
Received request via: Patient    Was the patient seen in the last year in this department? Yes    Does the patient have an active prescription (recently filled or refills available) for medication(s) requested? Yes.     Does the patient have [facility] Plus and need 100 day supply (blood pressure, diabetes and cholesterol meds only)? No

## 2023-01-05 ENCOUNTER — TELEPHONE (OUTPATIENT)
Dept: NEUROLOGY | Facility: MEDICAL CENTER | Age: 53
End: 2023-01-05
Payer: COMMERCIAL

## 2023-01-05 NOTE — TELEPHONE ENCOUNTER
NEUROLOGY PATIENT PRE-VISIT PLANNING     Patient was NOT contacted to complete PVP.  Note: Patient will not be contacted if there is no indication to call.     Patient Appointment is scheduled as: New Patient     Is visit type and length scheduled correctly? Yes    [employer] Patient is checked in Patient Demographics? Yes    3.   Is referral attached to visit? Yes    4. Were records received from referring provider? Yes, this is a patient of Ariana Friedman that is now being seen by you so all records are in [facility].     4. Patient was NOT contacted to have someone accompany them to visit.     5. Is this appointment scheduled as a Hospital Follow-Up?  No    6. Does the patient require any pre procedure or post procedure follow up? No    7. If any orders were placed at last visit or intended to be done for this visit do we have Results/Consult Notes? Yes  Labs - Labs ordered, completed on 10/08/22 and results are in chart.  Imaging - Imaging was not ordered at last office visit.  Referrals - A referral was placed to Neurology.(Ariana Friedman was leaving and needed to assign a new provider to her patient)    8. If patient appointment is for Botox - is order pended for provider? N/A

## 2023-01-12 ENCOUNTER — APPOINTMENT (OUTPATIENT)
Dept: NEUROLOGY | Facility: MEDICAL CENTER | Age: 53
End: 2023-01-12
Attending: STUDENT IN AN ORGANIZED HEALTH CARE EDUCATION/TRAINING PROGRAM
Payer: COMMERCIAL

## 2023-02-13 ENCOUNTER — TELEPHONE (OUTPATIENT)
Dept: NEUROLOGY | Facility: MEDICAL CENTER | Age: 53
End: 2023-02-13
Payer: COMMERCIAL

## 2023-04-04 DIAGNOSIS — G40.109 LOCALIZATION-RELATED EPILEPSY (HCC): ICD-10-CM

## 2023-04-04 NOTE — TELEPHONE ENCOUNTER
Received request via: Pharmacy    Was the patient seen in the last year in this department? Yes    Does the patient have an active prescription (recently filled or refills available) for medication(s) requested? Yes.     Does the patient have [facility] Plus and need 100 day supply (blood pressure, diabetes and cholesterol meds only)? No

## 2023-04-05 ENCOUNTER — TELEPHONE (OUTPATIENT)
Dept: NEUROLOGY | Facility: MEDICAL CENTER | Age: 53
End: 2023-04-05

## 2023-04-05 RX ORDER — LACOSAMIDE 150 MG/1
150 TABLET ORAL 2 TIMES DAILY
Qty: 60 TABLET | Refills: 3 | Status: SHIPPED | OUTPATIENT
Start: 2023-04-05 | End: 2023-06-26 | Stop reason: SDUPTHER

## 2023-04-05 NOTE — TELEPHONE ENCOUNTER
Lacosamide (Vimpat) 150mg Tablet    Request rec'd via [facility], RTTERE Mckeon paid copay $0.00 #200/100 DS notifying liaison Doretha Coats or Outreach - 04/05/2023 8:43am

## 2023-05-31 ENCOUNTER — TELEPHONE (OUTPATIENT)
Dept: HEALTH INFORMATION MANAGEMENT | Facility: OTHER | Age: 53
End: 2023-05-31
Payer: COMMERCIAL

## 2023-06-26 ENCOUNTER — OFFICE VISIT (OUTPATIENT)
Dept: NEUROLOGY | Facility: MEDICAL CENTER | Age: 53
End: 2023-06-26
Attending: STUDENT IN AN ORGANIZED HEALTH CARE EDUCATION/TRAINING PROGRAM
Payer: COMMERCIAL

## 2023-06-26 ENCOUNTER — TELEPHONE (OUTPATIENT)
Dept: NEUROLOGY | Facility: MEDICAL CENTER | Age: 53
End: 2023-06-26
Payer: COMMERCIAL

## 2023-06-26 VITALS
TEMPERATURE: 98.4 F | HEART RATE: 77 BPM | HEIGHT: 64 IN | OXYGEN SATURATION: 96 % | WEIGHT: 127.43 LBS | SYSTOLIC BLOOD PRESSURE: 112 MMHG | BODY MASS INDEX: 21.75 KG/M2 | DIASTOLIC BLOOD PRESSURE: 70 MMHG

## 2023-06-26 DIAGNOSIS — E55.9 VITAMIN D DEFICIENCY: ICD-10-CM

## 2023-06-26 DIAGNOSIS — G20.A1 PARKINSON DISEASE (HCC): ICD-10-CM

## 2023-06-26 DIAGNOSIS — G40.109 LOCALIZATION-RELATED EPILEPSY (HCC): ICD-10-CM

## 2023-06-26 PROCEDURE — 99211 OFF/OP EST MAY X REQ PHY/QHP: CPT | Performed by: STUDENT IN AN ORGANIZED HEALTH CARE EDUCATION/TRAINING PROGRAM

## 2023-06-26 PROCEDURE — 99215 OFFICE O/P EST HI 40 MIN: CPT | Performed by: STUDENT IN AN ORGANIZED HEALTH CARE EDUCATION/TRAINING PROGRAM

## 2023-06-26 PROCEDURE — 3074F SYST BP LT 130 MM HG: CPT | Performed by: STUDENT IN AN ORGANIZED HEALTH CARE EDUCATION/TRAINING PROGRAM

## 2023-06-26 PROCEDURE — 3078F DIAST BP <80 MM HG: CPT | Performed by: STUDENT IN AN ORGANIZED HEALTH CARE EDUCATION/TRAINING PROGRAM

## 2023-06-26 PROCEDURE — G2212 PROLONG OUTPT/OFFICE VIS: HCPCS | Performed by: STUDENT IN AN ORGANIZED HEALTH CARE EDUCATION/TRAINING PROGRAM

## 2023-06-26 RX ORDER — MULTIVIT-MIN/IRON/FOLIC ACID/K 18-600-40
1 CAPSULE ORAL DAILY
Qty: 90 CAPSULE | Refills: 3 | Status: SHIPPED | OUTPATIENT
Start: 2023-06-26

## 2023-06-26 RX ORDER — LACOSAMIDE 150 MG/1
150 TABLET ORAL 2 TIMES DAILY
Qty: 180 TABLET | Refills: 3 | Status: SHIPPED | OUTPATIENT
Start: 2023-06-26 | End: 2023-09-05

## 2023-06-26 ASSESSMENT — FIBROSIS 4 INDEX: FIB4 SCORE: 0.65

## 2023-06-26 ASSESSMENT — PATIENT HEALTH QUESTIONNAIRE - PHQ9: CLINICAL INTERPRETATION OF PHQ2 SCORE: 0

## 2023-06-26 NOTE — PROGRESS NOTES
[facility]    Patient name: Karri Tyler  YOB: 1970  MRN: [id number]  Referring provider: Hardik Quezada M.D.  [address] [place] [address]   Date of visit: 6/26/2023     CC: epilepsy follow up      HPI:    Karri Tyler is a 52 y.o. male who is being seen in transfer of care for seizures. Last seen 9/21/22 by LITZY Harper.    He is accompanied by Mary who is his support staff member. He lives in his own apartment and cares for himself with assistance from support staff.    He has developmental delay with a baseline abnormal gait, no recent falls. Seizure onset was in infancy.  He has a history of TBI and cerebral palsy.  When he was age 3 months he fell out of a hammock and started having seizures at that time.  He was on Dilantin for a number of years.  He had multiple subtherapeutic levels when he establish care in this practice around 2018.  There were issues with compliance in the past, however this is no longer a problem.    The semiology of the seizures were generalized tonic-clonic convulsions.  No tongue biting or incontinence.    No current concerns for mood issues.    Last seizure was last summer due to a lapse of Vimpat refills.     In more recent years, he has developed a left greater than right upper and lower extremity tremor, saw Dr. Malloy in November 2022 who felt the exam was most consistent with Parkinson's disease.  The patient symptoms improved on 3 times daily Sinemet, though he states the tremors are slightly worse than when they started.  He takes Sinemet at 8 AM, around noon, and around 8 PM.      No SE from Vimpat.         Barriers to taking medication appropriately: none, is very able to take medication    Driving: no, does not have a license     Seizure safety:    Vitamin D: Not currently taking.  He took 50,000 IU in the past but developed a supratherapeutic level and it was stopped.   His last level was low at 25.        ROS:   Constitutional: No fevers or chills.  Eyes: No blurry vision or eye pain.  ENT: No dysphagia or hearing loss.  Respiratory: No cough or shortness of breath.  Cardiovascular: No chest pain or palpitations.  GI: No nausea, vomiting, or diarrhea.  : No urinary incontinence or dysuria.  Musculoskeletal: No joint swelling or arthralgias.  Skin: No skin rashes.  Neuro: No headaches, dizziness, or tremors.  Endocrine: No heat or cold intolerance. No polydipsia or polyuria.  Psych: No depression or anxiety.  Heme/Lymph: No easy bruising or swollen lymph nodes.  All other review of systems were reviewed and were negative.     Past Medical History:   Past Medical History:   Diagnosis Date    Seizure (HCC)        Past Surgical History: History reviewed. No pertinent surgical history.    Social History:   Social History     Socioeconomic History    Marital status: Single     Spouse name: Not on file    Number of children: Not on file    Years of education: Not on file    Highest education level: Not on file   Occupational History    Not on file   Tobacco Use    Smoking status: Never    Smokeless tobacco: Never   Vaping Use    Vaping Use: Never used   Substance and Sexual Activity    Alcohol use: No    Drug use: No    Sexual activity: Not on file   Other Topics Concern    Not on file   Social History Narrative    Not on file     Social Determinants of Health     Financial Resource Strain: Not on file   Food Insecurity: Not on file   Transportation Needs: Not on file   Physical Activity: Not on file   Stress: Not on file   Social Connections: Not on file   Intimate Partner Violence: Not on file   Housing Stability: Not on file       Family Hx: History reviewed. No pertinent family history.    Current Medications:   Current Outpatient Medications:     Vitamin D, Cholecalciferol, 50 MCG (2000 UT) Cap, Take 1 Tablet by mouth every day., Disp: 90 Capsule, Rfl: 3    Lacosamide 150 MG Tab,  Take 150 mg by mouth 2 times a day for 180 days., Disp: 180 Tablet, Rfl: 3    carbidopa-levodopa (SINEMET)  MG Tab, Take 1 Tablet by mouth 3 times a day., Disp: 270 Tablet, Rfl: 2    Allergies: No Known Allergies      Physical Exam:   Ambulatory Vitals  Vitals:    06/26/23 1417   BP: 112/70   Pulse: 77   Temp: 36.9 °C (98.4 °F)   SpO2: 96%       Constitutional: Well-developed, well-nourished, good hygiene. Appears stated age.  Respiratory: Normal respiratory effort  Skin: Warm, dry, intact. No rashes observed.  Neurologic:   Mental Status: Awake, alert, oriented x 3.   Speech: Mildly slurred   Memory: Able to recall 3 words at 1 minute and 5 minutes. Able to recall recent and remote events accurately.    Concentration: Attentive.  Has difficulty performing two-step or greater tasks   Fund of Knowledge: Significantly decreased.   Cranial Nerves:    CN II: PERRL     CN III, IV, VI: EOMI without nystagmus    CN V: Facial sensation intact and symmetric in all 3 trigeminal distributions    CN VII: No facial asymmetry    CN VIII: Hearing intact to f voice    CN IX and X: Palate elevates symmetrically, gag reflex not tested    CN XI: Symmetric shoulder shrug     CN XII: Tongue midline   Motor: 5/5 in upper and lower extremities bilaterally   Sensory: Intact and equal to light touch diffusely   Coordination: No evidence of past-pointing on finger to nose testing, no dysdiadochokinesia. Heel to shin intact.    DTR's: 2+ throughout    Gait: ambulates steadily without assistive device. Romberg negative. Able to perform tandem gait.    Movements: Resting pill-rolling tremor left greater than right upper extremity, foot tremor lower extremity  Musculoskeletal:    Strength: as above   Tone: Tone is increased in left upper and lower extremity with cogwheeling rigidity in the upper extremity   Joints: No swelling    Studies:      Labs reviewed. Last vitamin D level low at 25.      Imaging:   MRI brain without, date of service  11/28/2018:  1.  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  2.  Age-related cerebral atrophy.  3.  Minimal periventricular and juxtacortical white matter changes consistent with chronic microvascular ischemic gliosis.       EEG Results:   Ambulatory EEG 1/6/2020  INTERPRETATION:   This is an abnormal 24 hours ambulatory electroencephalogram recording in the awake, drowsy and sleep state. There is slowing in the right frontal temporal region, suggestive of an underlying structural abnormality.  Rare right frontotemporal sharps noted, which increase the risk for seizures, and suggest underlying cortical irritability.  No events or seizures were captured during the study. Clinical and radiological correlation is recommended.    Routine EEG 3/20/2019  INTERPRETATION:  This is a normal video EEG recording in the awake and briefly drowsy state(s).  Clinical correlation is recommended.  An isolated sharp discharges seen over the right anterior temporal region, of unclear clinical significance, though if clinically appropriate based on the patient's history, and ambulatory tracing might be appropriate.     Note: A normal EEG does not rule out epilepsy.  As above, if the clinical suspicion remains high for seizures, a prolonged recording to capture clinical or subclinical events may be helpful.         Assessment/Plan:   Karri Tyler is a 52 y.o. man with a history of localization-related epilepsy, with a history of probable focal to bilateral tonic-clonic seizures.  When his semiology has been convulsions lasting about 2 minutes with postictal confusion.  He has been stable at a dose of Vimpat 150 mg twice daily.  He had breakthrough seizures when he ran out of medication, but he is better with taking the medication and staying on top of refills when he was at that time.    Labs reviewed.  Vitamin D level was low at 25 on the last check.  I recommended that he start taking vitamin D  supplementation again and wrote a prescription for it from the pharmacy.    No new difficulty, no medication side effects.  Does not drive.    Counseled regarding seizure safety, medication side effects.  Refills provided.    He also has seen Dr. Malloy for Parkinson's disease, is overdue for follow-up.  Patient tells me tremor has worsened recently, may need to go up on the medication so I will defer to movement disorders at this time unless he has difficulty being seen.    Follow-up in 1 year, or sooner if needed for epilepsy.    Joyce Davis M.D.   6/26/2023     During today's encounter we discussed available treatment options and their individual side effect profiles. Total encounter time caring for patient today 70 minutes.

## 2023-06-26 NOTE — TELEPHONE ENCOUNTER
Lacosamide (Vimpat) 150 MG Tabs    Request rec'd via [facility], [place], See if provider will give 100 DS as it is same $0.00 copay as 90 DS notifying liaison Doretha Coats or Outreach - 06/26/2023 2:46pm

## 2023-07-06 DIAGNOSIS — G20.A1 PARKINSON DISEASE (HCC): ICD-10-CM

## 2023-09-05 DIAGNOSIS — G40.109 LOCALIZATION-RELATED EPILEPSY (HCC): ICD-10-CM

## 2023-09-05 RX ORDER — LACOSAMIDE 150 MG/1
150 TABLET, FILM COATED ORAL 2 TIMES DAILY
Qty: 180 TABLET | Refills: 3 | Status: SHIPPED | OUTPATIENT
Start: 2023-09-05 | End: 2024-03-08

## 2023-09-05 NOTE — TELEPHONE ENCOUNTER
Received request via: Pharmacy    Was the patient seen in the last year in this department? Yes    Does the patient have an active prescription (recently filled or refills available) for medication(s) requested? No    Does the patient have correction Plus and need 100 day supply (blood pressure, diabetes and cholesterol meds only)?no

## 2023-09-06 ENCOUNTER — TELEPHONE (OUTPATIENT)
Dept: NEUROLOGY | Facility: MEDICAL CENTER | Age: 53
End: 2023-09-06
Payer: COMMERCIAL

## 2023-09-06 NOTE — TELEPHONE ENCOUNTER
Lacosamide (Vimpat) 150 MG Tabs    RTS - Consider 100 DS for same copay as 90 DS Refill Payable on or after 09/28/23 DUR-Refill too Soon - 09/06/2023 9:26am

## 2023-10-09 ENCOUNTER — TELEPHONE (OUTPATIENT)
Dept: NEUROLOGY | Facility: MEDICAL CENTER | Age: 53
End: 2023-10-09
Payer: COMMERCIAL

## 2023-11-02 ENCOUNTER — OFFICE VISIT (OUTPATIENT)
Dept: NEUROLOGY | Facility: MEDICAL CENTER | Age: 53
End: 2023-11-02
Attending: PSYCHIATRY & NEUROLOGY
Payer: COMMERCIAL

## 2023-11-02 ENCOUNTER — TELEPHONE (OUTPATIENT)
Dept: NEUROLOGY | Facility: MEDICAL CENTER | Age: 53
End: 2023-11-02

## 2023-11-02 VITALS
SYSTOLIC BLOOD PRESSURE: 90 MMHG | TEMPERATURE: 98.3 F | BODY MASS INDEX: 21.45 KG/M2 | OXYGEN SATURATION: 96 % | RESPIRATION RATE: 14 BRPM | HEART RATE: 92 BPM | WEIGHT: 125.66 LBS | DIASTOLIC BLOOD PRESSURE: 50 MMHG | HEIGHT: 64 IN

## 2023-11-02 DIAGNOSIS — G20.A1 PARKINSON DISEASE (HCC): ICD-10-CM

## 2023-11-02 DIAGNOSIS — G20.A1 PARKINSON'S DISEASE WITHOUT DYSKINESIA OR FLUCTUATING MANIFESTATIONS (HCC): ICD-10-CM

## 2023-11-02 DIAGNOSIS — Z86.69 HISTORY OF CEREBRAL PALSY: ICD-10-CM

## 2023-11-02 PROCEDURE — 99214 OFFICE O/P EST MOD 30 MIN: CPT | Performed by: PSYCHIATRY & NEUROLOGY

## 2023-11-02 PROCEDURE — 3078F DIAST BP <80 MM HG: CPT | Performed by: PSYCHIATRY & NEUROLOGY

## 2023-11-02 PROCEDURE — 99211 OFF/OP EST MAY X REQ PHY/QHP: CPT | Performed by: PSYCHIATRY & NEUROLOGY

## 2023-11-02 PROCEDURE — 3074F SYST BP LT 130 MM HG: CPT | Performed by: PSYCHIATRY & NEUROLOGY

## 2023-11-02 ASSESSMENT — FIBROSIS 4 INDEX: FIB4 SCORE: 0.65

## 2023-11-02 NOTE — TELEPHONE ENCOUNTER
Received Refill PA request via [facility]  for Carbidopa-Levodopa  MG Tabs . (Quantity:600, Day Supply:100) - Order written for 90 DS) - Copay $0.00      Insurance: [employer]  Member ID:  [id number]  BIN: [id number]  PCN: [id number]  Group: [id number]     Ran Test claim via [employer] & medication Pays for a $0.00 copay. Will outreach to patient to offer specialty pharmacy services and or release to preferred pharmacy

## 2023-11-02 NOTE — PROGRESS NOTES
Chief Complaint   Patient presents with    Follow-Up     Parkinson disease       History of present illness:  Karri Tyler 52 y.o. male with Parkinson's disease. His sinemet was discontinued and he presented to me bradykinetic. I resumed his sinemet on 11/18/22.    He has not had significant improvement with the carbidopa/levodopa.  He reports that he does take the Sinemet with sandwiches, yogurt, or bananas.    Past medical history:   Past Medical History:   Diagnosis Date    Seizure (HCC)        Past surgical history:   No past surgical history on file.    Family history:   No family history on file.    Social history:   Social History     Socioeconomic History    Marital status: Single     Spouse name: Not on file    Number of children: Not on file    Years of education: Not on file    Highest education level: Not on file   Occupational History    Not on file   Tobacco Use    Smoking status: Never    Smokeless tobacco: Never   Vaping Use    Vaping Use: Never used   Substance and Sexual Activity    Alcohol use: No    Drug use: No    Sexual activity: Not on file   Other Topics Concern    Not on file   Social History Narrative    Not on file     Social Determinants of Health     Financial Resource Strain: Not on file   Food Insecurity: Not on file   Transportation Needs: Not on file   Physical Activity: Not on file   Stress: Not on file   Social Connections: Not on file   Intimate Partner Violence: Not on file   Housing Stability: Not on file       Current medications:   Current Outpatient Medications   Medication    carbidopa-levodopa (SINEMET)  MG Tab    VIMPAT 150 MG Tab    Vitamin D, Cholecalciferol, 50 MCG (2000 UT) Cap     No current facility-administered medications for this visit.       Medication Allergy:  No Known Allergies    Physical examination:   Vitals:    11/02/23 1535 11/02/23 1539   BP: 110/72 90/50   BP Location: Left arm Left arm   Patient Position: Sitting Standing   BP  "Cuff Size: Adult Adult   Pulse: 76 92   Resp: 14    Temp: 36.8 °C (98.3 °F)    TempSrc: Temporal    SpO2: 97% 96%   Weight: 57 kg (125 lb 10.6 oz)    Height: 1.626 m (5' 4\")      Neurological Exam  Mental Status  Awake and alert. Language is fluent with no aphasia.    Motor   Increased muscle tone. Spastic tone in the lower extremities L>R. The following abnormal movements were seen: There is constant left arm greater than left leg resting tremor.  The tremor is persistent with a postural hold.  There is no action tremor..    Patient is bradykinetic, and takes a long time to perform the finger-to-nose task.  It is challenging for able to perform finger tapping and hand opening closing. .    Gait    Posture is stooped.  There is circumduction of the legs with ambulation.  His legs.  Spastic when he ambulates.  He has flexion of the left elbow and shoulder when he ambulates..       Labs:  I reviewed the following labs personally:  None    Imaging:   None     ASSESSMENT AND PLAN:  Problem List Items Addressed This Visit       Parkinson disease    Relevant Medications    carbidopa-levodopa (SINEMET)  MG Tab    History of cerebral palsy       1. Parkinson's disease without dyskinesia or fluctuating manifestations    2. Parkinson disease  - carbidopa-levodopa (SINEMET)  MG Tab; Take 2 Tablets by mouth 3 times a day.  Dispense: 540 Tablet; Refill: 2    3. History of cerebral palsy    This is a 52-year-old male with history of cerebral palsy, with recent development of left-sided resting tremor.  He notes that his leg spasticity has been present since birth, has a he has a recent onset of left sided parkinsonism over the last few years.  A dose of 100 mg of L-dopa 3 times daily has been insufficient and treating his resting tremor.  Therefore, I have provided instructions for him to increase the dose up to 200 mg 3 times daily and, and to avoid food, fat/protein, with the L-dopa.  He will follow-up in 3 " months.    FOLLOW-UP:   Return in about 3 months (around 2/2/2024).    Dr. Will Malloy D.O.  [facility]

## 2023-11-02 NOTE — PATIENT INSTRUCTIONS
Increase carbidopa/levodopa   1.5 tabs 3 times daily for the next 2 weeks   If no improvement after 2 weeks,   Increase carbidopa/levodopa again to   2 tabs 3 times daily    Avoid protein rich or fatty foods 1 hour before or after the L-dopa.

## 2023-11-24 ENCOUNTER — TELEPHONE (OUTPATIENT)
Dept: PHARMACY | Facility: MEDICAL CENTER | Age: 53
End: 2023-11-24
Payer: COMMERCIAL

## 2023-11-24 NOTE — TELEPHONE ENCOUNTER
Attempted to contact patient at [phone] to discuss [facility] and services/benefits offered. No answer, left voicemail.     Roro Brown  Rx Coordinator   [phone]

## 2023-11-30 NOTE — TELEPHONE ENCOUNTER
Back to back Attempts to contact patient at [phone] to discuss [facility] and services/benefits offered. No answer, left voicemail.    Roro Brown  Rx Coordinator   [phone]

## 2024-03-07 DIAGNOSIS — G40.109 LOCALIZATION-RELATED EPILEPSY (HCC): ICD-10-CM

## 2024-03-08 RX ORDER — LACOSAMIDE 150 MG/1
TABLET ORAL
Qty: 180 TABLET | Refills: 1 | Status: SHIPPED | OUTPATIENT
Start: 2024-03-08 | End: 2024-09-04

## 2024-03-08 NOTE — TELEPHONE ENCOUNTER
Received request via: Pharmacy    Medication Name/Dosage: Lacosamide 150MG    When was medication last prescribed 06/20/23    How many refills were previously provided 3    How many Refills does he patient have left from last prescription 0    Was the patient seen in the last year in this department? Yes   Date of last office visit 11/03/23     Per last Neurology Office Visit, when was the date of next follow up visit set for?                            Date of office visit follow up request 3 months     Does the patient have an upcoming appointment? Yes   If yes, when 05/31/24             If no, schedule appointment N/A    Does the patient have [facility] Plus and need 100 day supply (blood pressure, diabetes and cholesterol meds only)? Medication is not for cholesterol, blood pressure or diabetes    PATIENT ALSO HAD VIMPAT PRESCRIPTION

## 2024-05-21 ENCOUNTER — OFFICE VISIT (OUTPATIENT)
Dept: NEUROLOGY | Facility: MEDICAL CENTER | Age: 54
End: 2024-05-21
Attending: PSYCHIATRY & NEUROLOGY
Payer: MEDICAID

## 2024-05-21 VITALS
TEMPERATURE: 97.5 F | SYSTOLIC BLOOD PRESSURE: 110 MMHG | HEIGHT: 65 IN | BODY MASS INDEX: 20.57 KG/M2 | DIASTOLIC BLOOD PRESSURE: 66 MMHG | OXYGEN SATURATION: 97 % | WEIGHT: 123.46 LBS | HEART RATE: 72 BPM

## 2024-05-21 DIAGNOSIS — G20.A1 PARKINSON'S DISEASE WITHOUT DYSKINESIA OR FLUCTUATING MANIFESTATIONS (HCC): ICD-10-CM

## 2024-05-21 PROCEDURE — 3078F DIAST BP <80 MM HG: CPT | Performed by: PSYCHIATRY & NEUROLOGY

## 2024-05-21 PROCEDURE — 99214 OFFICE O/P EST MOD 30 MIN: CPT | Performed by: PSYCHIATRY & NEUROLOGY

## 2024-05-21 PROCEDURE — 3074F SYST BP LT 130 MM HG: CPT | Performed by: PSYCHIATRY & NEUROLOGY

## 2024-05-21 RX ORDER — TRIHEXYPHENIDYL HYDROCHLORIDE 2 MG/1
TABLET ORAL
Qty: 291 TABLET | Refills: 0 | Status: SHIPPED | OUTPATIENT
Start: 2024-05-21 | End: 2024-09-02

## 2024-05-21 ASSESSMENT — FIBROSIS 4 INDEX: FIB4 SCORE: 0.67

## 2024-05-21 NOTE — PATIENT INSTRUCTIONS
Your medications will be:         6am                  12pm                 6pm  Carbidopa/levodopa  2 tabs      x                         x                        x  Trihexyphenidyl                         x                         x                        x   Lacosamide                               x                                                   x

## 2024-05-21 NOTE — PROGRESS NOTES
Chief Complaint   Patient presents with    Follow-Up     Parkinson's disease without dyskinesia or fluctuating manifestations       History of present illness:  Karri Tyler 53 y.o. male with Parkinson's disease. His sinemet was discontinued and he presented to me bradykinetic. I resumed his sinemet on 11/18/22.    He has not had significant improvement with the carbidopa/levodopa.  He reports that he does take the Sinemet with sandwiches, yogurt, or bananas.     11/2/23: L-dopa was increased to dose of 200mg TID.     Past medical history:   Past Medical History:   Diagnosis Date    Seizure (HCC)        Past surgical history:   No past surgical history on file.    Family history:   No family history on file.    Social history:   Social History     Socioeconomic History    Marital status: Single     Spouse name: Not on file    Number of children: Not on file    Years of education: Not on file    Highest education level: Not on file   Occupational History    Not on file   Tobacco Use    Smoking status: Never    Smokeless tobacco: Never   Vaping Use    Vaping status: Never Used   Substance and Sexual Activity    Alcohol use: No    Drug use: No    Sexual activity: Not on file   Other Topics Concern    Not on file   Social History Narrative    Not on file     Social Determinants of Health     Financial Resource Strain: Not on file   Food Insecurity: Not on file   Transportation Needs: Not on file   Physical Activity: Not on file   Stress: Not on file   Social Connections: Not on file   Intimate Partner Violence: Not on file   Housing Stability: Not on file       Current medications:   Current Outpatient Medications   Medication    trihexyphenidyl (ARTANE) 2 MG Tab    Lacosamide 150 MG Tab    carbidopa-levodopa (SINEMET)  MG Tab    Vitamin D, Cholecalciferol, 50 MCG (2000 UT) Cap     No current facility-administered medications for this visit.       Medication Allergy:  No Known  "Allergies    Physical examination:   Vitals:    05/21/24 0849   BP: 110/66   BP Location: Left arm   Patient Position: Sitting   BP Cuff Size: Adult   Pulse: 72   Temp: 36.4 °C (97.5 °F)   SpO2: 97%   Weight: 56 kg (123 lb 7.3 oz)   Height: 1.651 m (5' 5\")     Severe bilateral upper extremity resting tremors    ASSESSMENT AND PLAN:  Problem List Items Addressed This Visit          Neurology Medicine Problems    Parkinson disease ([facility])    Relevant Medications    trihexyphenidyl (ARTANE) 2 MG Tab       1. Parkinson's disease without dyskinesia or fluctuating manifestations ([facility])  - trihexyphenidyl (ARTANE) 2 MG Tab; Take 1 Tablet by mouth every day for 7 days, THEN 1 Tablet 2 (two) times a day for 7 days, THEN 1 Tablet 3 times a day for 90 days.  Dispense: 291 Tablet; Refill: 0    Carb/levo has not been effective for his resting tremors, therefore I started trihexyphenidyl today.   He will build up trihexyphenidyl to 2mg TID and continue to take carb/levo.     FOLLOW-UP:   Return in about 3 months (around 8/21/2024).    Dr. Will Malloy D.O.  [facility]    "

## 2024-08-01 DIAGNOSIS — G20.A1 PARKINSON DISEASE (HCC): ICD-10-CM

## 2024-08-01 RX ORDER — CARBIDOPA AND LEVODOPA 25; 100 MG/1; MG/1
1 TABLET ORAL 3 TIMES DAILY
Qty: 270 TABLET | Refills: 0 | Status: SHIPPED | OUTPATIENT
Start: 2024-08-01

## 2024-08-13 DIAGNOSIS — G20.A1 PARKINSON'S DISEASE WITHOUT DYSKINESIA OR FLUCTUATING MANIFESTATIONS (HCC): ICD-10-CM

## 2024-08-13 RX ORDER — TRIHEXYPHENIDYL HYDROCHLORIDE 2 MG/1
2 TABLET ORAL 3 TIMES DAILY
Qty: 270 TABLET | Refills: 2 | Status: SHIPPED | OUTPATIENT
Start: 2024-08-13

## 2024-08-13 NOTE — TELEPHONE ENCOUNTER
Received request via: Pharmacy    Medication Name/Dosage   trihexyphenidyl (ARTANE) 2 MG Tab     When was medication last prescribed 05/21/2024    How many refills were previously provided 0    How many Refills does he patient have left from last prescription 0    Was the patient seen in the last year in this department? Yes   Date of last office visit 05/21/2024     Per last Neurology Office Visit, when was the date of next follow up visit set for?                          3 months   Date of office visit follow up request 08/21/2024     Does the patient have an upcoming appointment? Yes   If yes, when 08/22/2024             If no, schedule appointment scheduled    Does the patient have alf Plus and need 100 day supply (blood pressure, diabetes and cholesterol meds only)? Patient does not have SCP

## 2024-10-23 ENCOUNTER — OFFICE VISIT (OUTPATIENT)
Dept: NEUROLOGY | Facility: MEDICAL CENTER | Age: 54
End: 2024-10-23
Attending: PSYCHIATRY & NEUROLOGY
Payer: COMMERCIAL

## 2024-10-23 VITALS
DIASTOLIC BLOOD PRESSURE: 60 MMHG | BODY MASS INDEX: 22.17 KG/M2 | OXYGEN SATURATION: 98 % | WEIGHT: 129.85 LBS | TEMPERATURE: 96.8 F | HEART RATE: 66 BPM | SYSTOLIC BLOOD PRESSURE: 118 MMHG | HEIGHT: 64 IN | RESPIRATION RATE: 16 BRPM

## 2024-10-23 DIAGNOSIS — G20.A1 PARKINSON'S DISEASE WITHOUT DYSKINESIA OR FLUCTUATING MANIFESTATIONS (HCC): ICD-10-CM

## 2024-10-23 DIAGNOSIS — Z86.69 HISTORY OF CEREBRAL PALSY: ICD-10-CM

## 2024-10-23 PROCEDURE — 3074F SYST BP LT 130 MM HG: CPT | Performed by: PSYCHIATRY & NEUROLOGY

## 2024-10-23 PROCEDURE — 3078F DIAST BP <80 MM HG: CPT | Performed by: PSYCHIATRY & NEUROLOGY

## 2024-10-23 PROCEDURE — 99211 OFF/OP EST MAY X REQ PHY/QHP: CPT | Performed by: PSYCHIATRY & NEUROLOGY

## 2024-10-23 PROCEDURE — 99213 OFFICE O/P EST LOW 20 MIN: CPT | Performed by: PSYCHIATRY & NEUROLOGY

## 2024-10-23 ASSESSMENT — PATIENT HEALTH QUESTIONNAIRE - PHQ9
SUM OF ALL RESPONSES TO PHQ QUESTIONS 1-9: 5
CLINICAL INTERPRETATION OF PHQ2 SCORE: 2
5. POOR APPETITE OR OVEREATING: 0 - NOT AT ALL

## 2024-11-08 DIAGNOSIS — G20.A1 PARKINSON DISEASE (HCC): ICD-10-CM

## 2024-11-08 RX ORDER — CARBIDOPA AND LEVODOPA 25; 100 MG/1; MG/1
1 TABLET ORAL 3 TIMES DAILY
Qty: 270 TABLET | Refills: 2 | Status: SHIPPED | OUTPATIENT
Start: 2024-11-08

## 2024-11-08 NOTE — TELEPHONE ENCOUNTER
Received request via: Pharmacy    Medication Name/Dosage   carbidopa-levodopa (SINEMET)  MG Tab     When was medication last prescribed 08/01/2024    How many refills were previously provided 0    How many Refills does he patient have left from last prescription 0    Was the patient seen in the last year in this department? Yes   Date of last office visit 10/23/2024     Per last Neurology Office Visit, when was the date of next follow up visit set for?                          4 months  Date of office visit follow up request 2/23/2025     Does the patient have an upcoming appointment? Yes   If yes, when 02/26/2025             If no, schedule appointment scheduled.     Does the patient have [facility] Plus and need 100 day supply (blood pressure, diabetes and cholesterol meds only)? Patient does not have SCP